# Patient Record
Sex: MALE | Race: WHITE | Employment: FULL TIME | URBAN - METROPOLITAN AREA
[De-identification: names, ages, dates, MRNs, and addresses within clinical notes are randomized per-mention and may not be internally consistent; named-entity substitution may affect disease eponyms.]

---

## 2017-06-19 ENCOUNTER — HOSPITAL ENCOUNTER (OUTPATIENT)
Dept: ULTRASOUND IMAGING | Age: 42
Discharge: HOME | End: 2017-06-19
Payer: COMMERCIAL

## 2017-06-19 DIAGNOSIS — E04.1 THYROID NODULE: ICD-10-CM

## 2017-06-19 PROCEDURE — 76536 US EXAM OF HEAD AND NECK: CPT

## 2017-07-31 ENCOUNTER — OFFICE VISIT (OUTPATIENT)
Dept: ENDOCRINOLOGY | Age: 42
End: 2017-07-31

## 2017-07-31 VITALS
HEART RATE: 80 BPM | HEIGHT: 69 IN | BODY MASS INDEX: 25.92 KG/M2 | WEIGHT: 175 LBS | SYSTOLIC BLOOD PRESSURE: 126 MMHG | DIASTOLIC BLOOD PRESSURE: 95 MMHG

## 2017-07-31 DIAGNOSIS — R93.89 ABNORMAL THYROID ULTRASOUND: ICD-10-CM

## 2017-07-31 DIAGNOSIS — D86.9 SARCOIDOSIS: ICD-10-CM

## 2017-07-31 DIAGNOSIS — E04.1 THYROID NODULE: Primary | ICD-10-CM

## 2017-07-31 RX ORDER — IBUPROFEN 200 MG
TABLET ORAL
COMMUNITY

## 2017-07-31 RX ORDER — BUDESONIDE AND FORMOTEROL FUMARATE DIHYDRATE 80; 4.5 UG/1; UG/1
1 AEROSOL RESPIRATORY (INHALATION) DAILY
COMMUNITY
Start: 2017-05-04

## 2017-07-31 RX ORDER — AMLODIPINE BESYLATE 5 MG/1
5 TABLET ORAL DAILY
COMMUNITY

## 2017-07-31 NOTE — MR AVS SNAPSHOT
Visit Information Date & Time Provider Department Dept. Phone Encounter #  
 7/31/2017  3:30 PM Jackie De Oliveira, [address] [facility]ology [phone] Follow-up Instructions Return in about 3 months (around 10/31/2017). Upcoming Health Maintenance Date Due DTaP/Tdap/Td series (1 - Tdap) 9/24/1997 INFLUENZA AGE 9 TO ADULT 8/1/2017 Allergies as of 7/31/2017  Review Complete On: 7/31/2017 By: Jackie De Oliveira MD  
  
 Severity Noted Reaction Type Reactions Other Plant, Animal, Environmental  07/31/2017    Other (comments) Dust, mold, cockroach Current Immunizations  Reviewed on 9/26/2014 No immunizations on file. Not reviewed this visit You Were Diagnosed With   
  
 Codes Comments Thyroid nodule    -  Primary ICD-10-CM: E04.1 ICD-9-CM: 241.0 [facility]     ICD-10-CM: D86.9 ICD-9-CM: 135 Abnormal thyroid ultrasound     ICD-10-CM: R93.8 ICD-9-CM: 794.5 Vitals BP Pulse Height(growth percentile) Weight(growth percentile) BMI Smoking Status (!) 126/95 80 5' 9\" (1.753 m) 175 lb (79.4 kg) 25.84 kg/m2 Former Smoker Vitals History BMI and BSA Data Body Mass Index Body Surface Area  
 25.84 kg/m 2 1.97 m 2 Preferred Pharmacy Pharmacy Name Phone [facility]/PH[facility]ACY #[id number]- [place] [facility] [address] [facility] AT [address] [place] [phone] Your Updated Medication List  
  
   
This list is accurate as of: 7/31/17  4:34 PM.  Always use your most recent med list. ADVIL 200 mg tablet Generic drug:  ibuprofen Take  by mouth. As needed. amLODIPine 5 mg tablet Commonly known as:  Angy Pall Take 5 mg by mouth daily. SYMBICORT 80-4.5 mcg/actuation Hfaa inhaler Generic drug:  budesonide-formoterol Take 1 Puff by inhalation daily. We Performed the Following T4, FREE [id number] CPT(R)] THYROID PEROXIDASE (TPO) AB [03054 CPT(R)] TSH 3RD GENERATION [46011 CPT(R)] Follow-up Instructions Return in about 3 months (around 10/31/2017). To-Do List   
 08/02/2017 Imaging:  US GUIDE FINE NDL ASP W IMAGE Patient Instructions Thyroid nodule: Increased over last 4 years Reassess thyroid function Will order fine needle aspiration of nodule Labs to evaluate thyroid function Introducing [facility] SERVICES! [facility] introduces [employer] patient portal. Now you can access parts of your medical record, email your doctor's office, and request medication refills online. 1. In your internet browser, go to [email or web address] 2. Click on the First Time User? Click Here link in the Sign In box. You will see the New Member Sign Up page. 3. Enter your [employer] Access Code exactly as it appears below. You will not need to use this code after youve completed the sign-up process. If you do not sign up before the expiration date, you must request a new code. · [employer] Access Code: [email or web address] Expires: 9/14/2017 12:04 PM 
 
4. Enter the last four digits of your Social Security Number (xxxx) and Date of Birth (mm/dd/yyyy) as indicated and click Submit. You will be taken to the next sign-up page. 5. Create a [employer] ID. This will be your [employer] login ID and cannot be changed, so think of one that is secure and easy to remember. 6. Create a [employer] password. You can change your password at any time. 7. Enter your Password Reset Question and Answer. This can be used at a later time if you forget your password. 8. Enter your e-mail address. You will receive e-mail notification when new information is available in [address] 9. Click Sign Up. You can now view and download portions of your medical record. 10. Click the Download Summary menu link to download a portable copy of your medical information.  
 
If you have questions, please visit the Frequently Asked Questions section of the [employer]. Remember, [employer] is NOT to be used for urgent needs. For medical emergencies, dial 911. Now available from your iPhone and Android! Please provide this summary of care documentation to your next provider. Your primary care clinician is listed as Aisha Craven. If you have any questions after today's visit, please call [phone].

## 2017-07-31 NOTE — PROGRESS NOTES
History of Present Illness: Portia Garcia is a 37 y.o. male presents for follow-up of hypothyroidism  Nodules discovered incidentally on evaluation for sarcoidosis. Initial ultrasound done in 9/2014. Since last visit 1 year ago, he has moved to working at [facility] [employer] [place]. Overall, he has been doing well from a health standpoint, specifically, he has had less problems from the sarcoidosis. He has not needed any prednisone  Currently only takes a Symbicort inhaler as needed. He had an ultrasound done in advance of today's visit and this was discussed  Prior ultrasound nodule values  2014: 1.4 x 0.8 x 0.7  2015 1.4 x 0.8 x 1.3  2017 1.6 x 1.0 x 1.5      Family hx  Two brothers with type I diabetes. Also has family hx of thyroid disease. 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 Weight is up 6 pounds since last visit  He has baseline fatigue and feels particularly tired after lunch frequently. Social:      Past Medical History:   Diagnosis Date    Asthma     exercise induced asthma    Hypertension     Sarcoidosis ([address]     9/2014     Current Outpatient Prescriptions   Medication Sig    amLODIPine (NORVASC) 5 mg tablet Take 5 mg by mouth daily.  ibuprofen (ADVIL) 200 mg tablet Take  by mouth. As needed.  DULERA 100-5 mcg/actuation HFA inhaler      No current facility-administered medications for this visit. Allergies   Allergen Reactions    Other Plant, Animal, Environmental Other (comments)     Dust, mold, cockroach       Physical Examination:  Visit Vitals    BP (!) 126/95    Pulse 80    Ht 5' 9\" (1.753 m)    Wt 175 lb (79.4 kg)    BMI 25.84 kg/m2   -   - General: pleasant, no distress, normal gait   HEENT: hearing intact, EOMI, clear sclera without icterus  - Neck: +  thyromegaly with right lobe being more easily palpable than left lobe.   No LAD  - Cardiovascular: regular, normal rate - Respiratory: normal effort  - Integumentary: no edema  - Psychiatric: normal mood and affect    Data Reviewed:   Component      Latest Ref Rng & Units 8/11/2016 8/11/2016 8/11/2015 8/11/2015          11:13 AM 11:13 AM  9:36 AM  9:36 AM   TSH      0.450 - 4.500 uIU/mL 0.946   1.830   T4, Free      0.82 - 1.77 ng/dL  1.27 1.22    Thyroid peroxidase Ab      0 - 34 IU/mL         Component      Latest Ref Rng & Units 1/23/2015          10:52 AM   TSH      0.450 - 4.500 uIU/mL    T4, Free      0.82 - 1.77 ng/dL    Thyroid peroxidase Ab      0 - 34 IU/mL 8     6/2017 ultrasound  EXAM:  US THYROID/PARATHYROID/SOFT TISS     INDICATION:   Follow-up thyroid nodule. compare to 2015 study.     COMPARISON: 7/20/2015     FINDINGS: Real-time sonography of the thyroid gland was performed with a high  frequency linear transducer. Multiple static images were obtained.     The left lobe of the thyroid gland measures 5.0 x 1.7 x 1.5 cm. No nodules in  the left lobe.     The right lobe measures 5.6 x 2.2 x 2.1 cm. The nodular density posteriorly and  inferiorly in the right lobe of thyroid gland measures 1.6 x 1.0 x 1.5 cm. Previously this measured 1.4 x 0.8 x 1.3 cm the nodule is homogeneous in  appearance and iso to slightly hyperechoic. The margins are sharp. No cystic  degeneration or calcification is associated with this.     There are no new nodules. Isthmus of the thyroid gland measures 0.3 cm. The  thyroid gland remains heterogeneous bilaterally. IMPRESSION:  1. The nodular density inferiorly in the right lobe of thyroid gland has  slightly increased. Clinical correlation is suggested. Please see above text. Assessment/Plan:   1. Thyroid nodule   Thyroid ultrasound images personally reviewed by myself with Mr. Cl Solano present. The area of interest remains overall marginally defined.   Thyroid gland as a whole is more heterogeneous than prior values and overall has the appearance of a gland affected by Hashimoto's thyroiditis. Area of the nodule appears to be somewhat spared from this process and is more hyperechoic. Although I strongly feel the nodule is benign, due to the growth over the last 4 years, recommend he have it biopsied. Reviewed the FNA procedure with him [place]   2. Sarcoidosis ([facility]    3. Abnormal thyroid ultrasound   As noted above, thyroid ultrasound has features of Hashimoto's thyroiditis. Will check TSH, free T4 and thyroid peroxidase antibodies. He had questions about Hashimoto's thyroiditis and I did my best to explain this. Greater than 50% of 25 minute visit was spent counseling the patient about above, reviewing and discussing ultrasounds    Patient Instructions   Thyroid nodule: Increased over last 4 years    Reassess thyroid function    Will order fine needle aspiration of nodule    Labs to evaluate thyroid function        Follow-up Disposition:  Return in about 3 months (around 10/31/2017).     Copy sent to:

## 2017-07-31 NOTE — PATIENT INSTRUCTIONS
Thyroid nodule:   Increased over last 4 years    Reassess thyroid function    Will order fine needle aspiration of nodule    Labs to evaluate thyroid function

## 2017-08-01 LAB
T4 FREE SERPL-MCNC: 1.42 NG/DL (ref 0.82–1.77)
THYROPEROXIDASE AB SERPL-ACNC: 16 IU/ML (ref 0–34)
TSH SERPL DL<=0.005 MIU/L-ACNC: 0.88 UIU/ML (ref 0.45–4.5)

## 2017-08-04 ENCOUNTER — HOSPITAL ENCOUNTER (OUTPATIENT)
Dept: ULTRASOUND IMAGING | Age: 42
Discharge: HOME | End: 2017-08-04
Attending: INTERNAL MEDICINE
Payer: COMMERCIAL

## 2017-08-04 DIAGNOSIS — E04.1 THYROID NODULE: ICD-10-CM

## 2017-08-04 PROCEDURE — 88173 CYTOPATH EVAL FNA REPORT: CPT | Performed by: INTERNAL MEDICINE

## 2017-08-04 PROCEDURE — 10022 US GUIDE FINE NDL ASP W IMAGE: CPT

## 2017-08-04 PROCEDURE — 88305 TISSUE EXAM BY PATHOLOGIST: CPT | Performed by: INTERNAL MEDICINE

## 2017-08-07 NOTE — PROGRESS NOTES
Thyroid function tests is normal.  Possible that thyroid ultrasound abnormalities may be due to sarcoidosis. Await FNA of nodule. Honorio Stark,  Please call and mail lab letter. Thank you.

## 2017-08-14 ENCOUNTER — TELEPHONE (OUTPATIENT)
Dept: ENDOCRINOLOGY | Age: 42
End: 2017-08-14

## 2017-08-14 NOTE — TELEPHONE ENCOUNTER
MrKavon Shivam Carlos called and said that he had his thyroid Biopsy done and would like to discuss the results with nurse and wants to know what is the next step is. Sigrid zhao

## 2017-08-15 NOTE — TELEPHONE ENCOUNTER
Called  Estherpaul Dorsey to let him know biopsy results were normal/benign    Also discussed resent lab results.

## 2018-10-12 ENCOUNTER — HOSPITAL ENCOUNTER (OUTPATIENT)
Dept: CT IMAGING | Age: 43
Discharge: HOME | End: 2018-10-12
Attending: INTERNAL MEDICINE
Payer: COMMERCIAL

## 2018-10-12 DIAGNOSIS — C70.9 MALIGNANT NEOPLASM OF MENINGES (HCC): ICD-10-CM

## 2018-10-12 DIAGNOSIS — R10.9 ABDOMINAL PAIN: ICD-10-CM

## 2018-10-12 PROCEDURE — 74011636320 HC RX REV CODE- 636/320: Performed by: INTERNAL MEDICINE

## 2018-10-12 PROCEDURE — 74177 CT ABD & PELVIS W/CONTRAST: CPT

## 2018-10-12 RX ADMIN — IOPAMIDOL 100 ML: 755 INJECTION, SOLUTION INTRAVENOUS at 14:06

## 2019-02-10 ENCOUNTER — HOSPITAL ENCOUNTER (EMERGENCY)
Age: 44
Discharge: HOME | End: 2019-02-10
Attending: EMERGENCY MEDICINE | Admitting: EMERGENCY MEDICINE
Payer: COMMERCIAL

## 2019-02-10 ENCOUNTER — APPOINTMENT (OUTPATIENT)
Dept: CT IMAGING | Age: 44
End: 2019-02-10
Attending: EMERGENCY MEDICINE
Payer: COMMERCIAL

## 2019-02-10 VITALS
WEIGHT: 168.65 LBS | RESPIRATION RATE: 16 BRPM | BODY MASS INDEX: 24.98 KG/M2 | HEART RATE: 85 BPM | HEIGHT: 69 IN | DIASTOLIC BLOOD PRESSURE: 96 MMHG | OXYGEN SATURATION: 98 % | TEMPERATURE: 97.8 F | SYSTOLIC BLOOD PRESSURE: 141 MMHG

## 2019-02-10 DIAGNOSIS — R42 DIZZINESS: ICD-10-CM

## 2019-02-10 DIAGNOSIS — R11.0 NAUSEA WITHOUT VOMITING: ICD-10-CM

## 2019-02-10 DIAGNOSIS — G44.209 ACUTE NON INTRACTABLE TENSION-TYPE HEADACHE: Primary | ICD-10-CM

## 2019-02-10 LAB
ALBUMIN SERPL-MCNC: 3.8 G/DL (ref 3.5–5)
ALBUMIN/GLOB SERPL: 1 {RATIO} (ref 1.1–2.2)
ALP SERPL-CCNC: 52 U/L (ref 45–117)
ALT SERPL-CCNC: 22 U/L (ref 12–78)
ANION GAP SERPL CALC-SCNC: 5 MMOL/L (ref 5–15)
APPEARANCE UR: CLEAR
AST SERPL-CCNC: 20 U/L (ref 15–37)
BACTERIA URNS QL MICRO: NEGATIVE /HPF
BASOPHILS # BLD: 0 K/UL (ref 0–0.1)
BASOPHILS NFR BLD: 0 % (ref 0–1)
BILIRUB SERPL-MCNC: 0.5 MG/DL (ref 0.2–1)
BILIRUB UR QL: NEGATIVE
BUN SERPL-MCNC: 22 MG/DL (ref 6–20)
BUN/CREAT SERPL: 18 (ref 12–20)
CALCIUM SERPL-MCNC: 8.8 MG/DL (ref 8.5–10.1)
CHLORIDE SERPL-SCNC: 104 MMOL/L (ref 97–108)
CK SERPL-CCNC: 113 U/L (ref 39–308)
CO2 SERPL-SCNC: 30 MMOL/L (ref 21–32)
COLOR UR: NORMAL
CREAT SERPL-MCNC: 1.22 MG/DL (ref 0.7–1.3)
DIFFERENTIAL METHOD BLD: ABNORMAL
EOSINOPHIL # BLD: 0.5 K/UL (ref 0–0.4)
EOSINOPHIL NFR BLD: 13 % (ref 0–7)
EPITH CASTS URNS QL MICRO: NORMAL /LPF
ERYTHROCYTE [DISTWIDTH] IN BLOOD BY AUTOMATED COUNT: 12.2 % (ref 11.5–14.5)
GLOBULIN SER CALC-MCNC: 3.7 G/DL (ref 2–4)
GLUCOSE SERPL-MCNC: 90 MG/DL (ref 65–100)
GLUCOSE UR STRIP.AUTO-MCNC: NEGATIVE MG/DL
HCT VFR BLD AUTO: 45.6 % (ref 36.6–50.3)
HGB BLD-MCNC: 15.9 G/DL (ref 12.1–17)
HGB UR QL STRIP: NEGATIVE
HYALINE CASTS URNS QL MICRO: NORMAL /LPF (ref 0–5)
IMM GRANULOCYTES # BLD AUTO: 0 K/UL (ref 0–0.04)
IMM GRANULOCYTES NFR BLD AUTO: 0 % (ref 0–0.5)
KETONES UR QL STRIP.AUTO: NEGATIVE MG/DL
LEUKOCYTE ESTERASE UR QL STRIP.AUTO: NEGATIVE
LYMPHOCYTES # BLD: 0.9 K/UL (ref 0.8–3.5)
LYMPHOCYTES NFR BLD: 24 % (ref 12–49)
MAGNESIUM SERPL-MCNC: 2.1 MG/DL (ref 1.6–2.4)
MCH RBC QN AUTO: 30.2 PG (ref 26–34)
MCHC RBC AUTO-ENTMCNC: 34.9 G/DL (ref 30–36.5)
MCV RBC AUTO: 86.5 FL (ref 80–99)
MONOCYTES # BLD: 0.3 K/UL (ref 0–1)
MONOCYTES NFR BLD: 8 % (ref 5–13)
NEUTS SEG # BLD: 2.1 K/UL (ref 1.8–8)
NEUTS SEG NFR BLD: 54 % (ref 32–75)
NITRITE UR QL STRIP.AUTO: NEGATIVE
NRBC # BLD: 0 K/UL (ref 0–0.01)
NRBC BLD-RTO: 0 PER 100 WBC
PH UR STRIP: 6 [PH] (ref 5–8)
PLATELET # BLD AUTO: 175 K/UL (ref 150–400)
PMV BLD AUTO: 10.2 FL (ref 8.9–12.9)
POTASSIUM SERPL-SCNC: 3.4 MMOL/L (ref 3.5–5.1)
PROT SERPL-MCNC: 7.5 G/DL (ref 6.4–8.2)
PROT UR STRIP-MCNC: NEGATIVE MG/DL
RBC # BLD AUTO: 5.27 M/UL (ref 4.1–5.7)
RBC #/AREA URNS HPF: NORMAL /HPF (ref 0–5)
SODIUM SERPL-SCNC: 139 MMOL/L (ref 136–145)
SP GR UR REFRACTOMETRY: 1.01 (ref 1–1.03)
TROPONIN I SERPL-MCNC: <0.05 NG/ML
UA: UC IF INDICATED,UAUC: NORMAL
UROBILINOGEN UR QL STRIP.AUTO: 0.2 EU/DL (ref 0.2–1)
WBC # BLD AUTO: 3.9 K/UL (ref 4.1–11.1)
WBC URNS QL MICRO: NORMAL /HPF (ref 0–4)

## 2019-02-10 PROCEDURE — 36415 COLL VENOUS BLD VENIPUNCTURE: CPT

## 2019-02-10 PROCEDURE — 80053 COMPREHEN METABOLIC PANEL: CPT

## 2019-02-10 PROCEDURE — 93005 ELECTROCARDIOGRAM TRACING: CPT

## 2019-02-10 PROCEDURE — 81001 URINALYSIS AUTO W/SCOPE: CPT

## 2019-02-10 PROCEDURE — 70450 CT HEAD/BRAIN W/O DYE: CPT

## 2019-02-10 PROCEDURE — 83735 ASSAY OF MAGNESIUM: CPT

## 2019-02-10 PROCEDURE — 85025 COMPLETE CBC W/AUTO DIFF WBC: CPT

## 2019-02-10 PROCEDURE — 82550 ASSAY OF CK (CPK): CPT

## 2019-02-10 PROCEDURE — 99284 EMERGENCY DEPT VISIT MOD MDM: CPT

## 2019-02-10 PROCEDURE — 84484 ASSAY OF TROPONIN QUANT: CPT

## 2019-02-10 RX ORDER — MECLIZINE HYDROCHLORIDE 25 MG/1
25 TABLET ORAL
Qty: 10 TAB | Refills: 0 | Status: SHIPPED | OUTPATIENT
Start: 2019-02-10 | End: 2022-04-04

## 2019-02-10 RX ORDER — MECLIZINE HCL 12.5 MG 12.5 MG/1
25 TABLET ORAL
Status: DISCONTINUED | OUTPATIENT
Start: 2019-02-10 | End: 2019-02-11 | Stop reason: HOSPADM

## 2019-02-10 RX ORDER — ONDANSETRON 4 MG/1
4 TABLET, ORALLY DISINTEGRATING ORAL
Qty: 10 TAB | Refills: 0 | Status: SHIPPED | OUTPATIENT
Start: 2019-02-10 | End: 2022-04-04

## 2019-02-10 RX ORDER — ONDANSETRON 2 MG/ML
4 INJECTION INTRAMUSCULAR; INTRAVENOUS
Status: DISCONTINUED | OUTPATIENT
Start: 2019-02-10 | End: 2019-02-11 | Stop reason: HOSPADM

## 2019-02-11 LAB
ATRIAL RATE: 79 BPM
CALCULATED P AXIS, ECG09: 28 DEGREES
CALCULATED R AXIS, ECG10: -38 DEGREES
CALCULATED T AXIS, ECG11: 71 DEGREES
DIAGNOSIS, 93000: NORMAL
P-R INTERVAL, ECG05: 160 MS
Q-T INTERVAL, ECG07: 408 MS
QRS DURATION, ECG06: 90 MS
QTC CALCULATION (BEZET), ECG08: 467 MS
VENTRICULAR RATE, ECG03: 79 BPM

## 2019-02-11 NOTE — DISCHARGE INSTRUCTIONS
Patient Education        Dizziness: Care Instructions  Your Care Instructions  Dizziness is the feeling of unsteadiness or fuzziness in your head. It is different than having vertigo, which is a feeling that the room is spinning or that you are moving or falling. It is also different from lightheadedness, which is the feeling that you are about to faint. It can be hard to know what causes dizziness. Some people feel dizzy when they have migraine headaches. Sometimes bouts of flu can make you feel dizzy. Some medical conditions, such as heart problems or high blood pressure, can make you feel dizzy. Many medicines can cause dizziness, including medicines for high blood pressure, pain, or anxiety. If a medicine causes your symptoms, your doctor may recommend that you stop or change the medicine. If it is a problem with your heart, you may need medicine to help your heart work better. If there is no clear reason for your symptoms, your doctor may suggest watching and waiting for a while to see if the dizziness goes away on its own. Follow-up care is a key part of your treatment and safety. Be sure to make and go to all appointments, and call your doctor if you are having problems. It's also a good idea to know your test results and keep a list of the medicines you take. How can you care for yourself at home? · If your doctor recommends or prescribes medicine, take it exactly as directed. Call your doctor if you think you are having a problem with your medicine. · Do not drive while you feel dizzy. · Try to prevent falls. Steps you can take include:  ? Using nonskid mats, adding grab bars near the tub, and using night-lights. ? Clearing your home so that walkways are free of anything you might trip on.  ? Letting family and friends know that you have been feeling dizzy. This will help them know how to help you. When should you call for help? Call 911 anytime you think you may need emergency care.  For example, call if:    · You passed out (lost consciousness).     · You have dizziness along with symptoms of a heart attack. These may include:  ? Chest pain or pressure, or a strange feeling in the chest.  ? Sweating. ? Shortness of breath. ? Nausea or vomiting. ? Pain, pressure, or a strange feeling in the back, neck, jaw, or upper belly or in one or both shoulders or arms. ? Lightheadedness or sudden weakness. ? A fast or irregular heartbeat.     · You have symptoms of a stroke. These may include:  ? Sudden numbness, tingling, weakness, or loss of movement in your face, arm, or leg, especially on only one side of your body. ? Sudden vision changes. ? Sudden trouble speaking. ? Sudden confusion or trouble understanding simple statements. ? Sudden problems with walking or balance. ? A sudden, severe headache that is different from past headaches.    Call your doctor now or seek immediate medical care if:    · You feel dizzy and have a fever, headache, or ringing in your ears.     · You have new or increased nausea and vomiting.     · Your dizziness does not go away or comes back.    Watch closely for changes in your health, and be sure to contact your doctor if:    · You do not get better as expected. Where can you learn more? Go to http://sonja-josiah.info/. Enter T656 in the search box to learn more about \"Dizziness: Care Instructions. \"  Current as of: September 23, 2018  Content Version: 11.9  © 2448-7111 [employer]. Care instructions adapted under license by [employer] (which disclaims liability or warranty for this information). If you have questions about a medical condition or this instruction, always ask your healthcare professional. [place] [address] any warranty or liability for your use of this information. Patient Education        Headache: Care Instructions  Your Care Instructions    Headaches have many possible causes.  Most headaches aren't a sign of a more serious problem, and they will get better on their own. Home treatment may help you feel better faster. The doctor has checked you carefully, but problems can develop later. If you notice any problems or new symptoms, get medical treatment right away. Follow-up care is a key part of your treatment and safety. Be sure to make and go to all appointments, and call your doctor if you are having problems. It's also a good idea to know your test results and keep a list of the medicines you take. How can you care for yourself at home? · Do not drive if you have taken a prescription pain medicine. · Rest in a quiet, dark room until your headache is gone. Close your eyes and try to relax or go to sleep. Don't watch TV or read. · Put a cold, moist cloth or cold pack on the painful area for 10 to 20 minutes at a time. Put a thin cloth between the cold pack and your skin. · Use a warm, moist towel or a heating pad set on low to relax tight shoulder and neck muscles. · Have someone gently massage your neck and shoulders. · Take pain medicines exactly as directed. ? If the doctor gave you a prescription medicine for pain, take it as prescribed. ? If you are not taking a prescription pain medicine, ask your doctor if you can take an over-the-counter medicine. · Be careful not to take pain medicine more often than the instructions allow, because you may get worse or more frequent headaches when the medicine wears off. · Do not ignore new symptoms that occur with a headache, such as a fever, weakness or numbness, vision changes, or confusion. These may be signs of a more serious problem. To prevent headaches  · Keep a headache diary so you can figure out what triggers your headaches. Avoiding triggers may help you prevent headaches. Record when each headache began, how long it lasted, and what the pain was like (throbbing, aching, stabbing, or dull).  Write down any other symptoms you had with the headache, such as nausea, flashing lights or dark spots, or sensitivity to bright light or loud noise. Note if the headache occurred near your period. List anything that might have triggered the headache, such as certain foods (chocolate, cheese, wine) or odors, smoke, bright light, stress, or lack of sleep. · Find healthy ways to deal with stress. Headaches are most common during or right after stressful times. Take time to relax before and after you do something that has caused a headache in the past.  · Try to keep your muscles relaxed by keeping good posture. Check your jaw, face, neck, and shoulder muscles for tension, and try relaxing them. When sitting at a desk, change positions often, and stretch for 30 seconds each hour. · Get plenty of sleep and exercise. · Eat regularly and well. Long periods without food can trigger a headache. · Treat yourself to a massage. Some people find that regular massages are very helpful in relieving tension. · Limit caffeine by not drinking too much coffee, tea, or soda. But don't quit caffeine suddenly, because that can also give you headaches. · Reduce eyestrain from computers by blinking frequently and looking away from the computer screen every so often. Make sure you have proper eyewear and that your monitor is set up properly, about an arm's length away. · Seek help if you have depression or anxiety. Your headaches may be linked to these conditions. Treatment can both prevent headaches and help with symptoms of anxiety or depression. When should you call for help? Call 911 anytime you think you may need emergency care. For example, call if:    · You have signs of a stroke. These may include:  ? Sudden numbness, paralysis, or weakness in your face, arm, or leg, especially on only one side of your body. ? Sudden vision changes. ? Sudden trouble speaking. ? Sudden confusion or trouble understanding simple statements.   ? Sudden problems with walking or balance. ? A sudden, severe headache that is different from past headaches.    Call your doctor now or seek immediate medical care if:    · You have a new or worse headache.     · Your headache gets much worse. Where can you learn more? Go to http://sonja-josiah.info/. Enter M271 in the search box to learn more about \"Headache: Care Instructions. \"  Current as of: Claire 3, 2018  Content Version: 11.9  © 2006-2018 [employer]. Care instructions adapted under license by [employer] (which disclaims liability or warranty for this information). If you have questions about a medical condition or this instruction, always ask your healthcare professional. [place] [address] any warranty or liability for your use of this information. Patient Education        Nausea and Vomiting: Care Instructions  Your Care Instructions    When you are nauseated, you may feel weak and sweaty and notice a lot of saliva in your mouth. Nausea often leads to vomiting. Most of the time you do not need to worry about nausea and vomiting, but they can be signs of other illnesses. Two common causes of nausea and vomiting are stomach flu and food poisoning. Nausea and vomiting from viral stomach flu will usually start to improve within 24 hours. Nausea and vomiting from food poisoning may last from 12 to 48 hours. The doctor has checked you carefully, but problems can develop later. If you notice any problems or new symptoms, get medical treatment right away. Follow-up care is a key part of your treatment and safety. Be sure to make and go to all appointments, and call your doctor if you are having problems. It's also a good idea to know your test results and keep a list of the medicines you take. How can you care for yourself at home? · To prevent dehydration, drink plenty of fluids, enough so that your urine is light yellow or clear like water.  Choose water and other caffeine-free clear liquids until you feel better. If you have kidney, heart, or liver disease and have to limit fluids, talk with your doctor before you increase the amount of fluids you drink. · Rest in bed until you feel better. · When you are able to eat, try clear soups, mild foods, and liquids until all symptoms are gone for 12 to 48 hours. Other good choices include dry toast, crackers, cooked cereal, and gelatin dessert, such as Jell-O. When should you call for help? Call 911 anytime you think you may need emergency care. For example, call if:    · You passed out (lost consciousness).    Call your doctor now or seek immediate medical care if:    · You have symptoms of dehydration, such as:  ? Dry eyes and a dry mouth. ? Passing only a little dark urine. ? Feeling thirstier than usual.     · You have new or worsening belly pain.     · You have a new or higher fever.     · You vomit blood or what looks like coffee grounds.    Watch closely for changes in your health, and be sure to contact your doctor if:    · You have ongoing nausea and vomiting.     · Your vomiting is getting worse.     · Your vomiting lasts longer than 2 days.     · You are not getting better as expected. Where can you learn more? Go to http://sonja-josiah.info/. Enter 25 [id number] in the search box to learn more about \"Nausea and Vomiting: Care Instructions. \"  Current as of: September 23, 2018  Content Version: 11.9  © 0961-2457 [employer]. Care instructions adapted under license by [employer] (which disclaims liability or warranty for this information). If you have questions about a medical condition or this instruction, always ask your healthcare professional. [place] [address] any warranty or liability for your use of this information.

## 2019-02-11 NOTE — ED NOTES
Dr. Makenna Acevedo reviewed discharge instructions with the patient. The patient verbalized understanding. All questions and concerns were addressed. The patient declined a wheelchair and is discharged ambulatory in the care of family members with instructions and prescriptions in hand. Pt is alert and oriented x 4. Respirations are clear and unlabored.

## 2019-02-11 NOTE — ED NOTES
Assumed care of pt. Pt states he was feeling dizzy/faint since 1500 today. Pt states he has not done anything to alleviate symptoms, nothing makes it better or worse. Pt reports no nausea. Pt with wife (robert) at bedside.

## 2019-02-11 NOTE — ED PROVIDER NOTES
EMERGENCY DEPARTMENT HISTORY AND PHYSICAL EXAM      Date: 2/10/2019  Patient Name: Azeem Bush    History of Presenting Illness     Chief Complaint   Patient presents with    Headache     pt reports episode of pain to top of head at 1500 lasting for a few seconds with feeling of warmth all over, nausea, and dizziness lasting approx 1 hour    Dizziness       History Provided By: Patient    HPI: Azeem Bush, 44 y.o. male with PMHx significant for Sarcoidosis, HTN, and asthma, presents ambulatory to the ED with cc of an episode of 10/10 HA at 3 PM. He reports associated nausea, dizziness, and tingling in bilateral hands at that time. Pt describes the episode as feeling like he was \"hit on the top of the head with a baseball bat. \" He states the HA and associated sxs lasted for an hour before improving without intervention. Pt reports fatigue after sxs resolved. He reports HA is currently 3/10. Pt reports his BP was 131/90 after the episode resolved. He reports he has had 4 migraines over the past 2 years. Pt states his sxs did not feel like his migraines. He denies any trauma that prompted the episode. Pt confirms he has been eating and drinking normally. Pt denies associated photophobia, CP, or tinnitus. Pt denies taking blood thinners. Pt denies smoking. FMHx significant for HTN, DM, breast CA, and prostate CA. There are no other complaints, changes, or physical findings at this time. PCP: Ade Wells MD    No current facility-administered medications on file prior to encounter. Current Outpatient Medications on File Prior to Encounter   Medication Sig Dispense Refill    amLODIPine (NORVASC) 5 mg tablet Take 5 mg by mouth daily.  ibuprofen (ADVIL) 200 mg tablet Take  by mouth. As needed.  SYMBICORT 80-4.5 mcg/actuation HFAA inhaler Take 1 Puff by inhalation daily.          Past History     Past Medical History:  Past Medical History:   Diagnosis Date    Asthma     exercise induced asthma    Hypertension     Sarcoidosis     2014       Past Surgical History:  Past Surgical History:   Procedure Laterality Date    CHEST SURGERY PROCEDURE UNLISTED      NEEDLE BX    HX CYST REMOVAL  finger    2014    HX ORTHOPAEDIC  rt knee    2010  rt arthroscopic       Family History:  Family History   Problem Relation Age of Onset   [address] [facility] Cancer Mother         breast cancer    Hypertension Mother    [facility] Diabetes Father     Hypertension Father     Cancer Father         prostate cancer    Diabetes Brother         type I    Anesth Problems Neg Hx     Thyroid Disease Brother        Social History:  Social History     Tobacco Use    Smoking status: Former Smoker     Types: Cigarettes     Last attempt to quit: 2014     Years since quittin.4    Smokeless tobacco: Never Used   Substance Use Topics    Alcohol use: Yes     Comment: RARELY    Drug use: No       Allergies: Allergies   Allergen Reactions    Other Plant, Animal, Environmental Other (comments)     Dust, mold, cockroach         Review of Systems   Review of Systems   Constitutional: Positive for fatigue. Negative for appetite change. HENT: Negative for tinnitus. Eyes: Negative for photophobia. Respiratory: Negative for shortness of breath. Cardiovascular: Negative for chest pain. Gastrointestinal: Positive for nausea. Endocrine: Negative for heat intolerance. Genitourinary: Negative for dysuria. Musculoskeletal: Negative for back pain. Skin: Negative for rash. Allergic/Immunologic: Negative for immunocompromised state. Neurological: Positive for dizziness (spinning) and headaches. Positive for paresthesias in bilateral hands. Hematological: Does not bruise/bleed easily. Psychiatric/Behavioral: Negative. All other systems reviewed and are negative. Physical Exam   Physical Exam   Constitutional: He is oriented to person, place, and time. He appears well-developed and well-nourished.  No distress. HENT:   Head: Normocephalic and atraumatic. Eyes: EOM are normal. Pupils are equal, round, and reactive to light. Right eye exhibits no nystagmus. Left eye exhibits no nystagmus. Neck: Normal range of motion. Neck supple. Cardiovascular: Normal rate, regular rhythm and normal heart sounds. Pulmonary/Chest: Effort normal and breath sounds normal. He has no wheezes. Abdominal: Soft. Bowel sounds are normal. There is no tenderness. Musculoskeletal: Normal range of motion. He exhibits no edema or tenderness. Neurological: He is alert and oriented to person, place, and time. No cranial nerve deficit. Sensory intact. Motor intact. Skin: Skin is warm and dry. Psychiatric: He has a normal mood and affect. His behavior is normal.   Nursing note and vitals reviewed. Diagnostic Study Results     Labs -     Recent Results (from the past 12 hour(s))   EKG, 12 LEAD, INITIAL    Collection Time: 02/10/19  7:43 PM   Result Value Ref Range    Ventricular Rate 79 BPM    Atrial Rate 79 BPM    P-R Interval 160 ms    QRS Duration 90 ms    Q-T Interval 408 ms    QTC Calculation (Bezet) 467 ms    Calculated P Axis 28 degrees    Calculated R Axis -38 degrees    Calculated T Axis 71 degrees    Diagnosis       Normal sinus rhythm  Left axis deviation  No previous ECGs available     CBC WITH AUTOMATED DIFF    Collection Time: 02/10/19  7:48 PM   Result Value Ref Range    WBC 3.9 (L) 4.1 - 11.1 K/uL    RBC 5.27 4. 10 - 5.70 M/uL    HGB 15.9 12.1 - 17.0 g/dL    HCT 45.6 36.6 - 50.3 %    MCV 86.5 80.0 - 99.0 FL    MCH 30.2 26.0 - 34.0 PG    MCHC 34.9 30.0 - 36.5 g/dL    RDW 12.2 11.5 - 14.5 %    PLATELET 463 455 - 815 K/uL    MPV 10.2 8.9 - 12.9 FL    NRBC 0.0 0  WBC    ABSOLUTE NRBC 0.00 0.00 - 0.01 K/uL    NEUTROPHILS 54 32 - 75 %    LYMPHOCYTES 24 12 - 49 %    MONOCYTES 8 5 - 13 %    EOSINOPHILS 13 (H) 0 - 7 %    BASOPHILS 0 0 - 1 %    IMMATURE GRANULOCYTES 0 0.0 - 0.5 %    ABS.  NEUTROPHILS 2.1 1.8 - 8.0 K/UL    ABS. LYMPHOCYTES 0.9 0.8 - 3.5 K/UL    ABS. MONOCYTES 0.3 0.0 - 1.0 K/UL    ABS. EOSINOPHILS 0.5 (H) 0.0 - 0.4 K/UL    ABS. BASOPHILS 0.0 0.0 - 0.1 K/UL    ABS. IMM. GRANS. 0.0 0.00 - 0.04 K/UL    DF AUTOMATED     METABOLIC PANEL, COMPREHENSIVE    Collection Time: 02/10/19  7:48 PM   Result Value Ref Range    Sodium 139 136 - 145 mmol/L    Potassium 3.4 (L) 3.5 - 5.1 mmol/L    Chloride 104 97 - 108 mmol/L    CO2 30 21 - 32 mmol/L    Anion gap 5 5 - 15 mmol/L    Glucose 90 65 - 100 mg/dL    BUN 22 (H) 6 - 20 MG/DL    Creatinine 1.22 0.70 - 1.30 MG/DL    BUN/Creatinine ratio 18 12 - 20      GFR est AA >60 >60 ml/min/1.73m2    GFR est non-AA >60 >60 ml/min/1.73m2    Calcium 8.8 8.5 - 10.1 MG/DL    Bilirubin, total 0.5 0.2 - 1.0 MG/DL    ALT (SGPT) 22 12 - 78 U/L    AST (SGOT) 20 15 - 37 U/L    Alk.  phosphatase 52 45 - 117 U/L    Protein, total 7.5 6.4 - 8.2 g/dL    Albumin 3.8 3.5 - 5.0 g/dL    Globulin 3.7 2.0 - 4.0 g/dL    A-G Ratio 1.0 (L) 1.1 - 2.2     TROPONIN I    Collection Time: 02/10/19  7:48 PM   Result Value Ref Range    Troponin-I, Qt. <0.05 <0.05 ng/mL   CK W/ REFLX CKMB    Collection Time: 02/10/19  7:48 PM   Result Value Ref Range     39 - 308 U/L   MAGNESIUM    Collection Time: 02/10/19  7:48 PM   Result Value Ref Range    Magnesium 2.1 1.6 - 2.4 mg/dL   URINALYSIS W/ REFLEX CULTURE    Collection Time: 02/10/19  9:34 PM   Result Value Ref Range    Color YELLOW/STRAW      Appearance CLEAR CLEAR      Specific gravity 1.012 1.003 - 1.030      pH (UA) 6.0 5.0 - 8.0      Protein NEGATIVE  NEG mg/dL    Glucose NEGATIVE  NEG mg/dL    Ketone NEGATIVE  NEG mg/dL    Bilirubin NEGATIVE  NEG      Blood NEGATIVE  NEG      Urobilinogen 0.2 0.2 - 1.0 EU/dL    Nitrites NEGATIVE  NEG      Leukocyte Esterase NEGATIVE  NEG      WBC 0-4 0 - 4 /hpf    RBC 0-5 0 - 5 /hpf    Epithelial cells FEW FEW /lpf    Bacteria NEGATIVE  NEG /hpf    UA:UC IF INDICATED CULTURE NOT INDICATED BY UA RESULT CNI      Hyaline cast 0-2 0 - 5 /lpf       Radiologic Studies -   CT HEAD WO CONT   Final Result   IMPRESSION: No acute intracranial process              CT Results  (Last 48 hours)               02/10/19 2200  CT HEAD WO CONT Final result    Impression:  IMPRESSION: No acute intracranial process               Narrative:  EXAM: CT HEAD WO CONT   Clinical history: Headache and dizziness   INDICATION: pain/dizzy       COMPARISON: None. CONTRAST: None. TECHNIQUE: Unenhanced CT of the head was performed using 5 mm images. Brain and   bone windows were generated. CT dose reduction was achieved through use of a   standardized protocol tailored for this examination and automatic exposure   control for dose modulation. FINDINGS:   The ventricles and sulci are normal in size, shape and configuration and   midline. There is no significant white matter disease. There is no intracranial   hemorrhage, extra-axial collection, mass, mass effect or midline shift. The   basilar cisterns are open. No acute infarct is identified. The bone windows   demonstrate no abnormalities. The visualized portions of the paranasal sinuses   and mastoid air cells are clear. Medical Decision Making   I am the first provider for this patient. I reviewed the vital signs, available nursing notes, past medical history, past surgical history, family history and social history. Vital Signs-Reviewed the patient's vital signs. Patient Vitals for the past 12 hrs:   Temp Pulse Resp BP SpO2   02/10/19 1935 97.8 °F (36.6 °C) 85 16 (!) 141/96 98 %       EKG interpretation: (Preliminary) 19:43   Rhythm: normal sinus rhythm; and regular . Rate (approx.): 79; Axis: left axis deviation; TX interval: normal; QRS interval: normal ; ST/T wave: normal; Other: no prior EKG to compare.     Records Reviewed: Nursing Notes, Old Medical Records, Previous Radiology Studies and Previous Laboratory Studies    Provider Notes (Medical Decision Making): DDx: Acute HA, ICH, tension HA, vertigo, dehydration, electrolyte abnormality, and arrhythmia. ED Course:   Initial assessment performed. The patients presenting problems have been discussed, and they are in agreement with the care plan formulated and outlined with them. I have encouraged them to ask questions as they arise throughout their visit. Disposition:  Discharge Note:  10:13 PM  The pt is ready for discharge. The pt's signs, symptoms, diagnosis, and discharge instructions have been discussed and pt has conveyed their understanding. The pt is to follow up as recommended or return to ER should their symptoms worsen. Plan has been discussed and pt is in agreement. PLAN:  1. Current Discharge Medication List      START taking these medications    Details   ondansetron (ZOFRAN ODT) 4 mg disintegrating tablet Take 1 Tab by mouth every eight (8) hours as needed for Nausea. Qty: 10 Tab, Refills: 0      meclizine (ANTIVERT) 25 mg tablet Take 1 Tab by mouth three (3) times daily as needed for Dizziness. Qty: 10 Tab, Refills: 0           2. Follow-up Information     Follow up With Specialties Details Why Contact Info    Katheryn Sutton MD Neurology Call in 1 day  [address] [facility]  [phone]      Brandon Velasquez MD Family Practice In 2 days As needed [address] [address]  [phone]      [facility] EMERGENCY DEPT Emergency Medicine  If symptoms worsen [address] [facility]  [address] [facility]  [phone]        Return to ED if worse     Diagnosis     Clinical Impression:   1. Acute non intractable tension-type headache    2. Dizziness    3. Nausea without vomiting        Attestations: This note is prepared by Andria Fontana, acting as a Scribe for MD Shai Johnson MD: The scribe's documentation has been prepared under my direction and personally reviewed by me in its entirety.  I confirm that the notes above accurately reflects all work, treatment, procedures, and medical decision making performed by me.

## 2019-02-12 ENCOUNTER — OFFICE VISIT (OUTPATIENT)
Dept: NEUROLOGY | Age: 44
End: 2019-02-12

## 2019-02-12 VITALS
DIASTOLIC BLOOD PRESSURE: 82 MMHG | WEIGHT: 169 LBS | HEART RATE: 106 BPM | SYSTOLIC BLOOD PRESSURE: 130 MMHG | OXYGEN SATURATION: 98 % | HEIGHT: 69 IN | BODY MASS INDEX: 25.03 KG/M2

## 2019-02-12 DIAGNOSIS — G47.419 PRIMARY NARCOLEPSY WITHOUT CATAPLEXY: ICD-10-CM

## 2019-02-12 DIAGNOSIS — G47.50 EXPLODING HEAD SYNDROME: ICD-10-CM

## 2019-02-12 DIAGNOSIS — I67.1 CEREBRAL ANEURYSM: Primary | ICD-10-CM

## 2019-02-12 DIAGNOSIS — D86.89 NEUROSARCOIDOSIS: ICD-10-CM

## 2019-02-12 NOTE — PATIENT INSTRUCTIONS
MRI and CTA if normal then focus on the sleep doc. Ask him if you have a sleep disorder. · Narcolepsy  · Sleep apnea  · Idiopathic daytime hypersomnia. A Healthy Lifestyle: Care Instructions  Your Care Instructions    A healthy lifestyle can help you feel good, stay at a healthy weight, and have plenty of energy for both work and play. A healthy lifestyle is something you can share with your whole family. A healthy lifestyle also can lower your risk for serious health problems, such as high blood pressure, heart disease, and diabetes. You can follow a few steps listed below to improve your health and the health of your family. Follow-up care is a key part of your treatment and safety. Be sure to make and go to all appointments, and call your doctor if you are having problems. It's also a good idea to know your test results and keep a list of the medicines you take. How can you care for yourself at home? · Do not eat too much sugar, fat, or fast foods. You can still have dessert and treats now and then. The goal is moderation. · Start small to improve your eating habits. Pay attention to portion sizes, drink less juice and soda pop, and eat more fruits and vegetables. ? Eat a healthy amount of food. A 3-ounce serving of meat, for example, is about the size of a deck of cards. Fill the rest of your plate with vegetables and whole grains. ? Limit the amount of soda and sports drinks you have every day. Drink more water when you are thirsty. ? Eat at least 5 servings of fruits and vegetables every day. It may seem like a lot, but it is not hard to reach this goal. A serving or helping is 1 piece of fruit, 1 cup of vegetables, or 2 cups of leafy, raw vegetables. Have an apple or some carrot sticks as an afternoon snack instead of a candy bar. Try to have fruits and/or vegetables at every meal.  · Make exercise part of your daily routine.  You may want to start with simple activities, such as walking, bicycling, or slow swimming. Try to be active 30 to 60 minutes every day. You do not need to do all 30 to 60 minutes all at once. For example, you can exercise 3 times a day for 10 or 20 minutes. Moderate exercise is safe for most people, but it is always a good idea to talk to your doctor before starting an exercise program.  · Keep moving. Deadra Ozzie the lawn, work in the garden, or Mobil Oto Servis. Take the stairs instead of the elevator at work. · If you smoke, quit. People who smoke have an increased risk for heart attack, stroke, cancer, and other lung illnesses. Quitting is hard, but there are ways to boost your chance of quitting tobacco for good. ? Use nicotine gum, patches, or lozenges. ? Ask your doctor about stop-smoking programs and medicines. ? Keep trying. In addition to reducing your risk of diseases in the future, you will notice some benefits soon after you stop using tobacco. If you have shortness of breath or asthma symptoms, they will likely get better within a few weeks after you quit. · Limit how much alcohol you drink. Moderate amounts of alcohol (up to 2 drinks a day for men, 1 drink a day for women) are okay. But drinking too much can lead to liver problems, high blood pressure, and other health problems. Family health  If you have a family, there are many things you can do together to improve your health. · Eat meals together as a family as often as possible. · Eat healthy foods. This includes fruits, vegetables, lean meats and dairy, and whole grains. · Include your family in your fitness plan. Most people think of activities such as jogging or tennis as the way to fitness, but there are many ways you and your family can be more active. Anything that makes you breathe hard and gets your heart pumping is exercise. Here are some tips:  ? Walk to do errands or to take your child to school or the bus.  ? Go for a family bike ride after dinner instead of watching TV.   Where can you learn more?  Go to http://sonja-josiah.info/. Enter C364 in the search box to learn more about \"A Healthy Lifestyle: Care Instructions. \"  Current as of: September 11, 2018  Content Version: 11.9  © 2247-7261 [employer]. Care instructions adapted under license by [employer] (which disclaims liability or warranty for this information). If you have questions about a medical condition or this instruction, always ask your healthcare professional. [place] [address] any warranty or liability for your use of this information. Office Policies      Paperwork            Any paperwork that needs to be completed has a 3 WEEK turnaround time. Phone calls/patient messages:    · Please allow up to 24 hours for someone in the office to contact you about your call or message. Be mindful your provider may be out of the office or your message may require further review. We encourage you to use [employer] for your messages as this is a faster, more efficient way to communicate with our office           Medication Refills:    · Prescription medications require up to 48 business hours to process. We encourage you to use [employer] for your refills. · For controlled medications: Please allow up to 72 business hours to process. Certain medications may require you to  a written prescription at our office.   · NO narcotic/controlled medications will be prescribed after 4pm Monday through Friday or on weekends

## 2019-02-12 NOTE — PROGRESS NOTES
Name: Candelario Henry    Chief Complaint: headache dizziness    This is a 44year old male with a medical history of sarcoidosis and hypertension. He presented to the ER 2 days ago with a severe headache was resting watching TV felt a sudden explosion had with burning of the upper torso. Severe dizziness and tenderness of the head. Felt thirsty and drank. five cups of water. Dizziness persisted and after about forty five minutes he called the on call physician and was directed to go to the ER. He was seen at [facility]. He has a mostly uneventful night. All his tests were normal.   Has an unrelated complaint of excessive daytime sleepiness. He is frequently drowsy behind the wheel and will fall asleep in meetings. He has several maneuvers that he does to help him stay awake such as opening the windows in the car to allow the cold weather to stimulate him as well as talking to the radio or playing loud. He has not experience any cataplexy. Assesment and Plan  1. Exploding head syndrome  Discussed diagnosis in detail. Shared literature regarding diagnosis. He agreed that the symptoms seem to mimic what he experienced. We will get an MRI of the brain as well as a CTA as symptoms may belie a more serious issue such as intracerebral aneurysm. 2. Narcolepsy   Has increased daytime sleepiness in spite of normal sleep. 3. Sarcoid  In remission. 4.  Intracranial aneurysm  CT of the brain. Allergies  Other plant, animal, environmental     Medications  Current Outpatient Medications   Medication Sig    ondansetron (ZOFRAN ODT) 4 mg disintegrating tablet Take 1 Tab by mouth every eight (8) hours as needed for Nausea.  meclizine (ANTIVERT) 25 mg tablet Take 1 Tab by mouth three (3) times daily as needed for Dizziness.  amLODIPine (NORVASC) 5 mg tablet Take 5 mg by mouth daily.  ibuprofen (ADVIL) 200 mg tablet Take  by mouth. As needed.     SYMBICORT 80-4.5 mcg/actuation HFAA inhaler Take 1 Puff by inhalation daily. No current facility-administered medications for this visit. Medical History  Past Medical History:   Diagnosis Date    Asthma     exercise induced asthma    Hypertension     Sarcoidosis     9/2014     Review of Systems   Constitutional: Negative for chills and fever. HENT: Negative for ear pain. Eyes: Negative for pain and discharge. Respiratory: Negative for cough and hemoptysis. Cardiovascular: Negative for chest pain and claudication. Gastrointestinal: Negative for constipation and diarrhea. Genitourinary: Negative for flank pain and hematuria. Musculoskeletal: Negative for back pain and myalgias. Skin: Negative for itching and rash. Neurological: Positive for headaches. Hypersomnia   Endo/Heme/Allergies: Negative for environmental allergies. Does not bruise/bleed easily. Psychiatric/Behavioral: Negative for depression and hallucinations. Exam:  Visit Vitals  /82   Pulse (!) 106   Ht 5' 9\" (1.753 m)   Wt 169 lb (76.7 kg)   SpO2 98%   BMI 24.96 kg/m²         General: Well developed, well nourished. Patient in no apparent distress   Head: Normocephalic, atraumatic, anicteric sclera   Neck Normal ROM, No thyromegally   Lungs:  Clear to auscultation bilaterally, No wheezes or rubs   Cardiac: Regular rate and rhythm with no murmurs. Abd: Bowel sounds were audible. No tenderness on palpation   Ext: No pedal edema   Skin: Supple no rash     NeurologicExam:  Mental Status: Alert and oriented to person place and time   Speech: Fluent no aphasia or dysarthria. Cranial Nerves:   II Intact visual fields. III, IV VI Extra ocular movements intact  V Facial sensation is normal.   VII Facial movement is symmetric.     VIII Hearing intact no nystagmus    IX, X Normal gag, symmetric movement of palate and uvula  XI Symmetric shoulder shrug and head turn   XII Tongue midline with no atrophy  Undialated exam of the optic discs revealed sharp discs with no hemorrhage or exudate. Motor:  Full and symmetric strength of upper and lower proximal and distal muscles. Normal bulk and tone. Reflexes:   Deep tendon reflexes 2+/4 and symmetric. Sensory:   Symmetric and intact with no perceived deficits modalities involving small or large fibers. Gait:  Gait is balanced and fluid with normal arm swing. Tremor:   No tremor noted. Cerebellar:  Coordination intact. Neurovascular: No carotid bruits. No JVD         Imaging  CT Results (most recent):  Results from Hospital Encounter encounter on 02/10/19   CT HEAD WO CONT    Narrative EXAM: CT HEAD WO CONT  Clinical history: Headache and dizziness  INDICATION: pain/dizzy    COMPARISON: None. CONTRAST: None. TECHNIQUE: Unenhanced CT of the head was performed using 5 mm images. Brain and  bone windows were generated. CT dose reduction was achieved through use of a  standardized protocol tailored for this examination and automatic exposure  control for dose modulation. FINDINGS:  The ventricles and sulci are normal in size, shape and configuration and  midline. There is no significant white matter disease. There is no intracranial  hemorrhage, extra-axial collection, mass, mass effect or midline shift. The  basilar cisterns are open. No acute infarct is identified. The bone windows  demonstrate no abnormalities. The visualized portions of the paranasal sinuses  and mastoid air cells are clear.       Impression IMPRESSION: No acute intracranial process           Lab Review  Lab Results   Component Value Date/Time    WBC 3.9 (L) 02/10/2019 07:48 PM    HCT 45.6 02/10/2019 07:48 PM    HGB 15.9 02/10/2019 07:48 PM    PLATELET 315 08/67/2938 07:48 PM       Lab Results   Component Value Date/Time    Sodium 139 02/10/2019 07:48 PM    Potassium 3.4 (L) 02/10/2019 07:48 PM    Chloride 104 02/10/2019 07:48 PM    CO2 30 02/10/2019 07:48 PM    Glucose 90 02/10/2019 07:48 PM    BUN 22 (H) 02/10/2019 07:48 PM    Creatinine 1.22 02/10/2019 07:48 PM    Calcium 8.8 02/10/2019 07:48 PM       Lab Results   Component Value Date/Time    Hemoglobin A1c 5.3 08/11/2016 11:13 AM       Lab Results   Component Value Date/Time    Cholesterol, total 182 08/11/2016 11:13 AM    HDL Cholesterol 41 08/11/2016 11:13 AM    LDL, calculated 109 (H) 08/11/2016 11:13 AM    VLDL, calculated 32 08/11/2016 11:13 AM    Triglyceride 161 (H) 08/11/2016 11:13 AM

## 2019-02-22 ENCOUNTER — HOSPITAL ENCOUNTER (OUTPATIENT)
Dept: CT IMAGING | Age: 44
Discharge: HOME | End: 2019-02-22
Payer: COMMERCIAL

## 2019-02-22 ENCOUNTER — HOSPITAL ENCOUNTER (OUTPATIENT)
Dept: MRI IMAGING | Age: 44
Discharge: HOME | End: 2019-02-22
Payer: COMMERCIAL

## 2019-02-22 DIAGNOSIS — D86.89 NEUROSARCOIDOSIS: ICD-10-CM

## 2019-02-22 DIAGNOSIS — I67.1 CEREBRAL ANEURYSM: ICD-10-CM

## 2019-02-22 PROCEDURE — 70496 CT ANGIOGRAPHY HEAD: CPT

## 2019-02-22 PROCEDURE — 70551 MRI BRAIN STEM W/O DYE: CPT

## 2019-02-22 RX ORDER — SODIUM CHLORIDE 9 MG/ML
50 INJECTION, SOLUTION INTRAVENOUS
Status: COMPLETED | OUTPATIENT
Start: 2019-02-22 | End: 2019-02-22

## 2019-02-22 RX ORDER — SODIUM CHLORIDE 0.9 % (FLUSH) 0.9 %
10 SYRINGE (ML) INJECTION
Status: COMPLETED | OUTPATIENT
Start: 2019-02-22 | End: 2019-02-22

## 2019-02-22 RX ADMIN — Medication 10 ML: at 09:20

## 2019-02-22 RX ADMIN — SODIUM CHLORIDE 50 ML/HR: 9 INJECTION, SOLUTION INTRAVENOUS at 09:21

## 2019-03-01 ENCOUNTER — DOCUMENTATION ONLY (OUTPATIENT)
Dept: NEUROLOGY | Age: 44
End: 2019-03-01

## 2022-03-08 NOTE — PROGRESS NOTES
HPI: Fab Mcfarland (: 1975) is a 40 y.o. male, patient, here for evaluation of the following chief complaint(s):    Finger Pain (Left index finger DIP joint pain, weakness. Plays tennis and piano. No known injury. Left hand dominant.)  The patient is seen today to evaluate his left hand. Patient had a history of excision of a right small finger cyst several years ago near the PIP joint. He has developed what seems to represent a similar dorsal radial mucous cyst at the DIP joint of the left index finger. There has been no fall or injury. He is an avid tennis and . He localizes discomfort to the left index DIP joint worse when he inadvertently strikes something. He denies any digital numbness or tingling or any other digital involvement and is seen for further treatment. Vitals:  Ht 5' 9\" (1.753 m)   Wt 162 lb (73.5 kg)   BMI 23.92 kg/m²    Body mass index is 23.92 kg/m². Allergies   Allergen Reactions    Other Plant, Animal, Environmental Other (comments)     Dust, mold, cockroach       Current Outpatient Medications   Medication Sig    lisinopriL (PRINIVIL, ZESTRIL) 5 mg tablet Take 1 Tablet by mouth daily.  ergocalciferol, vitamin D2, (VITAMIN D2 PO) Take  by mouth.  amLODIPine (NORVASC) 5 mg tablet Take 5 mg by mouth daily.  ibuprofen (ADVIL) 200 mg tablet Take  by mouth. As needed.  SYMBICORT 80-4.5 mcg/actuation HFAA inhaler Take 1 Puff by inhalation daily.  LOSARTAN PO Take  by mouth. (Patient not taking: Reported on 3/9/2022)    ondansetron (ZOFRAN ODT) 4 mg disintegrating tablet Take 1 Tab by mouth every eight (8) hours as needed for Nausea. (Patient not taking: Reported on 3/9/2022)    meclizine (ANTIVERT) 25 mg tablet Take 1 Tab by mouth three (3) times daily as needed for Dizziness. (Patient not taking: Reported on 3/9/2022)     No current facility-administered medications for this visit.        Past Medical History:   Diagnosis Date    Anxiety disorder     Asthma     exercise induced asthma    Constipation     Hypertension     Sarcoidosis     2014        Past Surgical History:   Procedure Laterality Date    HX CYST REMOVAL  finger        HX ORTHOPAEDIC  rt knee    2010  rt arthroscopic    [place] CHEST SURGERY PROCEDURE UNLISTED      [facility]       Family History   Problem Relation Age of Onset   [facility] Cancer Mother         breast cancer    Hypertension Mother     Diabetes Father     Hypertension Father     Cancer Father         prostate cancer    Diabetes Brother         type I    Thyroid Disease Brother     Anesth Problems Neg Hx         Social History     Tobacco Use    Smoking status: Former Smoker     Types: Cigarettes     Quit date: 2014     Years since quittin.5    Smokeless tobacco: Never Used   Substance Use Topics    Alcohol use: Yes     Comment: RARELY    Drug use: No        Review of Systems    ROS     Positive for: Musculoskeletal    Last edited by Denyse Severance on 3/9/2022  9:21 AM. (History)             Physical Exam    Both hands demonstrate good range of motion with no digital clicking or locking. No cyst or mass on the right hand but on the left hand is about a 3 mm cyst dorsal radial over the DIP joint. No nail changes. Pain is a little bit worse in deep flexion. No other cystic involvement. Imaging:    XR Results (most recent):  Results from Appointment encounter on 22    XR HAND BILAT AP LAT OBL (MIN 3 V)    Narrative  AP, lateral and oblique x-ray of both hands were obtained. Overall there is good osseous bone density perhaps just the slightest hint of osteopenia in the phalanxes. Minimal if any significant narrowing of the DIP spaces. No advanced spur formation. No acute fractures. ASSESSMENT/PLAN:  Below is the assessment and plan developed based on review of pertinent history, physical exam, labs, studies, and medications.     Patient examination was consistent with a dorsal radial mucous cyst of the left index finger DIP joint. This more commonly is associated with advanced arthritis. There is really minimal if any true narrowing of the DIP joint spaces in general and there is no dorsal spur formation on the lateral images. Currently recommended observation as he is only notices since late February. If this does enlarge worsen or become more painful I explained that he would likely be best treated with excision. I will see him back in 3 to 4 weeks to check his progress. 1. Left hand pain  -     XR HAND BILAT AP LAT OBL (MIN 3 V); Future  2. Digital mucinous cyst of finger of left hand      Return in about 4 weeks (around 4/6/2022). An electronic signature was used to authenticate this note.   -- Crystal Gallo MD

## 2022-03-09 ENCOUNTER — OFFICE VISIT (OUTPATIENT)
Dept: ORTHOPEDIC SURGERY | Age: 47
End: 2022-03-09
Payer: COMMERCIAL

## 2022-03-09 VITALS — WEIGHT: 162 LBS | HEIGHT: 69 IN | BODY MASS INDEX: 23.99 KG/M2

## 2022-03-09 DIAGNOSIS — M67.442 DIGITAL MUCINOUS CYST OF FINGER OF LEFT HAND: ICD-10-CM

## 2022-03-09 DIAGNOSIS — M79.642 LEFT HAND PAIN: Primary | ICD-10-CM

## 2022-03-09 PROCEDURE — 99203 OFFICE O/P NEW LOW 30 MIN: CPT | Performed by: ORTHOPAEDIC SURGERY

## 2022-03-09 RX ORDER — LISINOPRIL 5 MG/1
1 TABLET ORAL DAILY
COMMUNITY
Start: 2021-07-28

## 2022-03-09 NOTE — LETTER
3/9/2022    Patient: Vee Gibbons   YOB: 1975   Date of Visit: 3/9/2022     Cindi Brandt MD  [address] [place] [id number]  Via Fax: [phone]    Dear Cindi Brandt MD,      Thank you for referring Mr. Carlos Alberto Gonsales to [facility] for evaluation. My notes for this consultation are attached. If you have questions, please do not hesitate to call me. I look forward to following your patient along with you.       Sincerely,    Diana Lugo MD

## 2022-04-04 ENCOUNTER — OFFICE VISIT (OUTPATIENT)
Dept: ORTHOPEDIC SURGERY | Age: 47
End: 2022-04-04
Payer: COMMERCIAL

## 2022-04-04 VITALS — BODY MASS INDEX: 23.99 KG/M2 | HEIGHT: 69 IN | WEIGHT: 162 LBS

## 2022-04-04 DIAGNOSIS — M25.569 KNEE PAIN, UNSPECIFIED CHRONICITY, UNSPECIFIED LATERALITY: Primary | ICD-10-CM

## 2022-04-04 DIAGNOSIS — M23.8X2 CHONDRAL DEFECT OF LEFT PATELLA: ICD-10-CM

## 2022-04-04 PROCEDURE — 99214 OFFICE O/P EST MOD 30 MIN: CPT | Performed by: ORTHOPAEDIC SURGERY

## 2022-04-04 NOTE — PATIENT INSTRUCTIONS
MRI SCHEDULING INSTRUCTIONS    In order to schedule your MRI with [facility], please call [phone]. If it is more convenient, you may stop by the MRI department on the way out of the office. The MRI department is located on the 1st floor, [address]. They will be happy to assist you. Once you have your MRI scheduled, please contact our  at [phone] to make your follow up appointment with Dr. Gordon Hood to come in for your test results! The appointment should be made for at least 3 business days after the MRI appointment. Thank you,  Dr. Anamika Cohn III, MD         Magnetic Resonance Imaging (MRI): About This Test  What is it? Magnetic resonance imaging (MRI) is a test that uses a magnetic field and pulses of radio wave energy to make pictures of organs and structures inside the body. When you have an MRI, you lie on a table and your body is moved into the MRI machine, where an image is taken of the area of the body being studied. Why is this test done? There are many reasons to have an MRI test. This test can find problems such as tumors, bleeding, injury, conditions that some children are born with, and infection. An MRI also may provide more information about a problem seen on an X-ray, ultrasound scan, CT scan, or nuclear medicine exam.  How can you prepare for the test?  For some MRI pictures of the belly, you may be asked to not eat or drink for several hours before the test.  Tell your doctor if you get nervous in tight spaces. You may get a medicine to help you relax. If you think you'll get this medicine, be sure to arrange a ride home. It may be unsafe for you to drive or get home on your own. How is the test done? · You may have contrast materials (dye) put into your arm through a tube called an IV. · You will lie on a table that is part of the MRI scanner. · The table will slide into the space that contains the magnet.   · Inside the scanner you will hear a fan and feel air moving. You may hear tapping, thumping, or snapping noises. You may be given earplugs or headphones to reduce the noise. · You will be asked to hold still during the scan. You may be asked to hold your breath for short periods. · You may be alone in the scanning room. But a technologist will watch you through a window and talk with you during the test.  What are the risks of the test?  · Contrast material that contains gadolinium may be used in this test. But for most people, the benefit of its use in this test outweighs the risk. Be sure to tell your doctor if you have kidney problems or are pregnant. · If you breastfeed and are concerned about whether the dye used in this test is safe, talk to your doctor. Most experts believe that very little dye passes into breast milk and even less is passed on to the baby. But if you are concerned, you can stop breastfeeding for up to 24 hours after the test. During this time, you can give your baby breast milk that you stored before the test. Don't use the breast milk you pump in the 24 hours after the test. Throw it out. How long does the test take? The test usually takes 30 to 60 minutes. But it can take as long as 2 hours. What happens after the test?  You will probably be able to go home right away, depending on the reason for the test.  Follow-up care is a key part of your treatment and safety. Be sure to make and go to all appointments, and call your doctor if you are having problems. It's also a good idea to keep a list of the medicines you take. Ask your doctor when you can expect to have your test results. Where can you learn more? Go to http://www.gray.com/  Enter V016 in the search box to learn more about \"Magnetic Resonance Imaging (MRI): About This Test.\"  Current as of: June 17, 2021               Content Version: 13.0  © 4306-1740 [employer].    Care instructions adapted under license by [address] (which disclaims liability or warranty for this information). If you have questions about a medical condition or this instruction, always ask your healthcare professional. [address] any warranty or liability for your use of this information.

## 2022-04-12 PROBLEM — M23.8X2 CHONDRAL DEFECT OF LEFT PATELLA: Status: ACTIVE | Noted: 2022-04-12

## 2022-04-13 ENCOUNTER — OFFICE VISIT (OUTPATIENT)
Dept: ORTHOPEDIC SURGERY | Age: 47
End: 2022-04-13
Payer: COMMERCIAL

## 2022-04-13 VITALS — WEIGHT: 162 LBS | BODY MASS INDEX: 23.99 KG/M2 | HEIGHT: 69 IN

## 2022-04-13 DIAGNOSIS — M23.8X2 CHONDRAL DEFECT OF LEFT PATELLA: Primary | ICD-10-CM

## 2022-04-13 PROCEDURE — 99214 OFFICE O/P EST MOD 30 MIN: CPT | Performed by: ORTHOPAEDIC SURGERY

## 2022-04-13 NOTE — PROGRESS NOTES
ASSESSMENT/PLAN:  Below is the assessment and plan developed based on review of pertinent history, physical exam, labs, studies, and medications. 1. Knee pain, unspecified chronicity, unspecified laterality      No follow-ups on file. In discussion with the patient, we considered the numerus possible diagnoses that could be contributing to their present symptoms. We also deliberated on the extensive management options that must be considered to treat their current condition. We reviewed their accessible prior medical records, diagnostic tests, and current health and employment information. We considered how these symptoms were affecting the patient´s activities of daily living as well as employment and fitness activities. The patient had various questions regarding the possible risks, benefits, complications, morbidity and mortality regarding their diagnosis and treatment options. The patients´ comorbidities were considered, and I advocated that they consider maximizing lifestyle modification through nutrition and exercise to aid in addressing their symptoms. Shared decision making yielded an understanding to move forward with conservation treatment preferences. The patient expressed understanding that if conservative management fails to alleviate the present symptoms they will return to office for re-evaluation and consideration of additional diagnostic tests and potential surgical options. In the interim, we have recommended ice, elevation, and anti-inflammatory medications along with a physician directed home exercise program. We discussed the risks and common side effects of anti-inflammatory medications and instructed the patient to discontinue the medication and contact us if they experienced any side effects. The patient was encouraged to discuss the possible side effects with their family physician or pharmacist prior to initiating any new medications.     Given that the patient's symptoms are increasing in frequency and duration, we have decided to evaluate the etiology of the pain and loss of function with an MRI. We discussed the risks of an MRI which include, but are not limited to the enclosed space, noisy environment, magnetic effect on implanted metal. We also talked about the fact that MRI is also contraindicated in the presence of internal metallic objects such as bullets or shrapnel, as well as surgical clips, pins, plates, screws, metal sutures, or wire mesh. We talked about the fact that MRI does not use radiation, but it may be contrindicated if the patient has implanted pacemakers, intracranial aneurysm clips, cochlear implants, certain prosthetic devices, implanted drug infusion pumps, neurostimulators, bone-growth stimulators, certain intrauterine contraceptive devices; or any other type of iron-based metal implants. We discussed the fact that if you are pregnant or suspect that you may be pregnant, you should notify your physician and consult with your primary care or obstetrician before having an MRI. Although rare, we talked about the fact that if contrast dye is used, there is a risk for allergic reaction to the dye. Patients who are allergic to or sensitive to medications, contrast dye, iodine, or shellfish should notify the radiologist or technologist prior to the administration of dye. MRI contrast may also influence other conditions such as allergies, asthma, anemia, hypotension (low blood pressure), and sickle cell disease. The patient has expressed understanding of these risks and I will see the patient back after the MRI to discuss the findings as well as the treatment options. Given that the patient's symptoms are increasing in frequency and duration we have decided to prescribe physical therapy.   We talked about the fact that the goal of physical therapy is for the therapist to assist in developing a program to help return the patient to full strength, function and mobility and decrease pain. We also discussed that the therapist may combine several techniques to help decrease pain. These include but are not limited to stretching, balance exercises, strength training, massage, cold and heat therapy, and electrical stimulation. Although, physical therapy is generally safe, we went over the potential risks to include the worsening of pre-existing conditions, continued pain and no improvement in flexibility, mobility, and strength. We will have the patient follow up after physical therapy to closely monitor their progress. We talked about following up sooner if therapy is not progressing on a weekly basis. We talked about the fact that he had a patellofemoral chondral defect. We will focus primarily on warming up as well as stretching. We will also focus on preemptive anti-inflammatories prior to matches. SUBJECTIVE/OBJECTIVE:  Meka Francois (: 1975) is a 40 y.o. male, patient,here for evaluation of the No chief complaint on file. Marquis Yepezs He seen today for his left knee as well as his right knee. He reports he is very active with volleyball and tennis. Reports he did have surgery with a partial medial meniscectomy on the left knee in . He reports his knees feel full. He denies any traumatic incident. He reports that he still quite active with tennis. He denies any popping or clicking. He reports rest does make it better but activity makes it worse. Reports he has difficulty going up and down stairs. Physical Exam    Upon physical examination, the patient is well developed, well nourished, alert and oriented times three, with normal mood and affect and walks with an antalgic gait. Upon examination of the left knee, the patient is nontender to palpation along the medial and lateral joint lines, and has no effusion. They are tender to palpation along the medial and lateral facets of the patella.  They have crepitus of the patellofemoral joint with range of motion and discomfort with patella grind testing. The patient has no discomfort with Nadine´s maneuvers, and the knee is stable. They have full range of motion. They have 5/5 strength, and are neurovascularly intact distally. There is no erythema, warmth or skin lesions present. On examination of the contralateral extremity, the patient is nontender to palpation and has excellent range of motion, stability and strength. Imaging:    EXAM: MRI KNEE LT WO CONT     INDICATION: Left knee pain. Chondral defect of left patella. Prior arthroscopy     COMPARISON: None     TECHNIQUE: Axial T2 fat-saturated and proton density fat-saturated; coronal T1  and proton density fat-saturated; and sagittal T2 fat-saturated, proton density  fat-saturated, and gradient echo MRI of the left knee .     CONTRAST: None.      FINDINGS: Bone marrow: Within normal limits. No acute fracture, dislocation, or  marrow replacing process.      Joint fluid: None.      Collateral ligaments and posterior, lateral corner: Intact.     Medial meniscus: Degenerative signal is seen in the junction of the body and  posterior horn. There is a tiny focus of intermediate signal on a single slice  towards the free edge on series 8 image 8 but statistically will not represent a  tear.       Lateral meniscus: Intact.     ACL and PCL: Intact.     Tendons: Intact.     Muscles: Within normal limits.     Patellofemoral alignment: There is mild patellar subluxation and tilting. Trochlear groove is not hypoplastic. TT-TG distance: 11 mm     Articular cartilage: There is a 5 x 3 mm area of increased signal in the  cartilage of the lateral patellar facet. There is an associated full-thickness  fissure with underlying mild edema.     Soft tissue mass: None.     IMPRESSION     1. Full-thickness fissure in the lateral patellar facet with mild surrounding  chondromalacia and underlying mild subchondral edema.   2. Degenerative signal in the junction of the body and posterior horn of the  medial meniscus. A tiny focus of intermediate signal in the undersurface near  the free edge is seen on only a single slice and is statistically unlikely to  represent a tear. This may represent posttreatment change. Allergies   Allergen Reactions    Other Plant, Animal, Environmental Other (comments)     Dust, mold, cockroach       Current Outpatient Medications   Medication Sig    lisinopriL (PRINIVIL, ZESTRIL) 5 mg tablet Take 1 Tablet by mouth daily.  ergocalciferol, vitamin D2, (VITAMIN D2 PO) Take  by mouth.  LOSARTAN PO Take  by mouth. (Patient not taking: Reported on 3/9/2022)    ondansetron (ZOFRAN ODT) 4 mg disintegrating tablet Take 1 Tab by mouth every eight (8) hours as needed for Nausea. (Patient not taking: Reported on 3/9/2022)    meclizine (ANTIVERT) 25 mg tablet Take 1 Tab by mouth three (3) times daily as needed for Dizziness. (Patient not taking: Reported on 3/9/2022)    amLODIPine (NORVASC) 5 mg tablet Take 5 mg by mouth daily.  ibuprofen (ADVIL) 200 mg tablet Take  by mouth. As needed.  SYMBICORT 80-4.5 mcg/actuation HFAA inhaler Take 1 Puff by inhalation daily. No current facility-administered medications for this visit.        Past Medical History:   Diagnosis Date    Anxiety disorder     Asthma     exercise induced asthma    Constipation     Hypertension     Sarcoidosis     9/2014       Past Surgical History:   Procedure Laterality Date    HX CYST REMOVAL  finger    2014    HX ORTHOPAEDIC  rt knee    2010  rt arthroscopic    [place] CHEST SURGERY PROCEDURE UNLISTED      [facility]       Family History   Problem Relation Age of Onset   [facility] Cancer Mother         breast cancer    Hypertension Mother    [facility] Diabetes Father     Hypertension Father     Cancer Father         prostate cancer    Diabetes Brother         type I    Thyroid Disease Brother     Anesth Problems Neg Hx        Social History     Socioeconomic History    Marital status:      Spouse name: Not on file    Number of children: Not on file    Years of education: Not on file    Highest education level: Not on file   Occupational History    Not on file   Tobacco Use    Smoking status: Former Smoker     Types: Cigarettes     Quit date: 2014     Years since quittin.6    Smokeless tobacco: Never Used   Substance and Sexual Activity    Alcohol use: Yes     Comment: RARELY    Drug use: No    Sexual activity: Yes   Other Topics Concern    Not on file   Social History Narrative    . Works as a teacher. Social Determinants of Health     Financial Resource Strain:     Difficulty of Paying Living Expenses: Not on file   Food Insecurity:     Worried About Running Out of Food in the Last Year: Not on file    Robin of Food in the Last Year: Not on file   Transportation Needs:     Lack of Transportation (Medical): Not on file    Lack of Transportation (Non-Medical): Not on file   Physical Activity:     Days of Exercise per Week: Not on file    Minutes of Exercise per Session: Not on file   Stress:     Feeling of Stress : Not on file   Social Connections:     Frequency of Communication with Friends and Family: Not on file    Frequency of Social Gatherings with Friends and Family: Not on file    Attends Jain Services: Not on file    Active Member of [address] [place] [address] or Organizations: Not on file    Attends Club or Organization Meetings: Not on file    Marital Status: Not on file   Intimate Partner Violence:     Fear of Current or Ex-Partner: Not on file    Emotionally Abused: Not on file    Physically Abused: Not on file    Sexually Abused: Not on file   Housing Stability:     Unable to Pay for Housing in the Last Year: Not on file    Number of [place] in the Last Year: Not on file    Unstable Housing in the Last Year: Not on file       Review of Systems    No flowsheet data found. Vitals: There were no vitals taken for this visit. There is no height or weight on file to calculate BMI. An electronic signature was used to authenticate this note.   -- Lilly Prasad MD

## 2022-06-14 ENCOUNTER — OFFICE VISIT (OUTPATIENT)
Dept: ORTHOPEDIC SURGERY | Age: 47
End: 2022-06-14
Payer: COMMERCIAL

## 2022-06-14 VITALS — WEIGHT: 165 LBS | HEIGHT: 69 IN | BODY MASS INDEX: 24.44 KG/M2

## 2022-06-14 DIAGNOSIS — S93.421A SPRAIN OF DELTOID LIGAMENT OF RIGHT ANKLE, INITIAL ENCOUNTER: Primary | ICD-10-CM

## 2022-06-14 DIAGNOSIS — M25.571 RIGHT ANKLE PAIN, UNSPECIFIED CHRONICITY: ICD-10-CM

## 2022-06-14 PROCEDURE — 99212 OFFICE O/P EST SF 10 MIN: CPT | Performed by: ORTHOPAEDIC SURGERY

## 2022-06-14 NOTE — LETTER
6/14/2022    Patient: Perry Suarez   YOB: 1975   Date of Visit: 6/14/2022     Steve Varghese MD  [address] [place], [place] [address]r [facility] [address]  Via Fax: [phone]    Dear Steve Varghese MD,      Thank you for referring Mr. Rah Simeon to [facility] for evaluation. My notes for this consultation are attached. If you have questions, please do not hesitate to call me. I look forward to following your patient along with you.       Sincerely,    Aidan Rodrigues MD

## 2022-06-14 NOTE — PROGRESS NOTES
True Nickerson (: 1975) is a 40 y.o. male, patient,here for evaluation of the following   Chief Complaint   Patient presents with    Ankle Pain        ASSESSMENT/PLAN:  Below is the assessment and plan developed based on review of pertinent history, physical exam, labs, studies, and medications. 1. Sprain of deltoid ligament of right ankle, initial encounter  -     REFERRAL TO PHYSICAL THERAPY  2. Right ankle pain, unspecified chronicity  -     XR STANDING ANKLE RT MIN 3 V; Future  -     REFERRAL TO PHYSICAL THERAPY    Patient appears to have had a deltoid ligament sprain as he is most tender around that area, however no gross instability or abnormality to the joint. He does have what appears to be an old avulsion fracture where he is minimally tender. I recommend regaining some of the strength, balance and proprioception prior to returning to tennis. I did recommend using the brace for the next 2 to 4 weeks and then he can start weaning out of the brace. I have also referred him to physical therapy to start since he wants to get back to high-impact activities so in hopes that he would get back to those activities sooner rather than later. I do recommend repeat follow-up just to recheck make sure that the avulsion fracture is definitely old and not a new finding, there should be some difference in the x-rays at that site if indeed this was a new injury. However the treatment would have been the same, its a nondisplaced ankle mortise with normal joint. Next follow-up we will get new x-rays of right ankle 3 views nonweightbearing. Return in about 4 weeks (around 2022) for repeat xrays. Allergies   Allergen Reactions    Other Plant, Animal, Environmental Other (comments)     Dust, mold, cockroach       Current Outpatient Medications   Medication Sig    lisinopriL (PRINIVIL, ZESTRIL) 5 mg tablet Take 1 Tablet by mouth daily.  LOSARTAN PO Take  by mouth.     amLODIPine (NORVASC) 5 mg tablet Take 5 mg by mouth daily.  ibuprofen (ADVIL) 200 mg tablet Take  by mouth. As needed.  SYMBICORT 80-4.5 mcg/actuation HFAA inhaler Take 1 Puff by inhalation daily. No current facility-administered medications for this visit. Past Medical History:   Diagnosis Date    Anxiety disorder     Asthma     exercise induced asthma    Constipation     Hypertension     Sarcoidosis     2014       Past Surgical History:   Procedure Laterality Date    HX CYST REMOVAL  finger        HX ORTHOPAEDIC  rt knee      rt arthroscopic    [place] CHEST SURGERY PROCEDURE UNLISTED      NEEDLE BX       Family History   Problem Relation Age of Onset    Cancer Mother         breast cancer    Hypertension Mother     Diabetes Father     Hypertension Father     Cancer Father         prostate cancer    Diabetes Brother         type I    Thyroid Disease Brother     Anesth Problems Neg Hx        Social History     Socioeconomic History    Marital status:      Spouse name: Not on file    Number of children: Not on file    Years of education: Not on file    Highest education level: Not on file   Occupational History    Not on file   Tobacco Use    Smoking status: Former Smoker     Types: Cigarettes     Quit date: 2014     Years since quittin.8    Smokeless tobacco: Never Used   Vaping Use    Vaping Use: Never used   Substance and Sexual Activity    Alcohol use: Yes     Comment: RARELY    Drug use: No    Sexual activity: Yes   Other Topics Concern    Not on file   Social History Narrative    . Works as a teacher. Social Determinants of Health     Financial Resource Strain:     Difficulty of Paying Living Expenses: Not on file   Food Insecurity:     Worried About Running Out of Food in the Last Year: Not on file    Robin of Food in the Last Year: Not on file   Transportation Needs:     Lack of Transportation (Medical):  Not on file    Lack of Transportation (Non-Medical): Not on file   Physical Activity:     Days of Exercise per Week: Not on file    Minutes of Exercise per Session: Not on file   Stress:     Feeling of Stress : Not on file   Social Connections:     Frequency of Communication with Friends and Family: Not on file    Frequency of Social Gatherings with Friends and Family: Not on file    Attends Shinto Services: Not on file    Active Member of [address] [place] [address] or Organizations: Not on file    Attends Club or Organization Meetings: Not on file    Marital Status: Not on file   Intimate Partner Violence:     Fear of Current or Ex-Partner: Not on file    Emotionally Abused: Not on file    Physically Abused: Not on file    Sexually Abused: Not on file   Housing Stability:     Unable to Pay for Housing in the Last Year: Not on file    Number of [place] in the Last Year: Not on file    Unstable Housing in the Last Year: Not on file           Vitals:  Ht 5' 9\" (1.753 m)   Wt 165 lb (74.8 kg)   BMI 24.37 kg/m²    Body mass index is 24.37 kg/m². ROS     Positive for: Musculoskeletal    Last edited by Joey Bullock on 2022  8:59 AM. (History)              SUBJECTIVE/OBJECTIVE:  Randy Gandhi (: 1975)   New patient presents today with complaint of right ankle pain after rolling his ankle while playing tennis on 2022. Since that time he has been experiencing some swelling in and around the ankle and mostly focused to the medial aspect of ankle where he has noticed the swelling. The lateral side does not have as much pain when compared to the medial side. He has mostly stopped playing tennis since this happened. Currently he is not experiencing severe pain and he is weightbearing okay. There is some history of injuries to this ankle in the remote past with exercise activities similar to what he was doing. He denies any major medical problems such as diabetes, heart disease, states non-smoker.   Review of his chart reveals he does have sarcoidosis, hypertension and asthma. Physical Exam  Pleasant well-nourished male , alert and oriented to person, time and place, no acute distress. Mild antalgic gait, satisfactory weightbearing stance. Right lower extremity/ankle: Calves are soft nontender, ligaments grossly stable for anterior drawer lateral talar tilt stress, there is tenderness along the medial deltoid ligament, minimal at the medial malleolus, minimal at the anterior talofibular ligament, no tenderness at the lateral malleolus. Achilles tendon intact with negative Acharya test, negative ankle squeeze test.    Right foot: No severe malalignment or deformity, hindfoot is neutral with weightbearing stance, foot itself at the hindfoot, midfoot and forefoot nontender. Able to flex and extend toes satisfactory motion strength 5/5. Contralateral foot and ankle exam, nontender, no swelling ligaments grossly stable. Normal weightbearing stance. Neurovascular exam intact for light touch sensation, cap refill, dorsalis pedis pulse palpable, flexion/extension strength 5/5. Skin intact without open wounds, lesions or ulcers, no skin discolorations, normal warmth to skin. Imaging:    XR Results (most recent):  Results from Appointment encounter on 06/14/22    XR STANDING ANKLE RT MIN 3 V    Narrative  Right ankle standing AP, lateral and oblique x-rays show no acute fractures or dislocations although there is an avulsion type bone fragment at the medial malleolus, it also does not appear acute but more likely an older fracture. There is minimal soft tissue swelling, normal bone density. An electronic signature was used to authenticate this note.   -- Jonny Flood MD

## 2022-12-08 ENCOUNTER — OFFICE VISIT (OUTPATIENT)
Dept: ORTHOPEDIC SURGERY | Age: 47
End: 2022-12-08
Payer: COMMERCIAL

## 2022-12-08 VITALS — BODY MASS INDEX: 24.88 KG/M2 | WEIGHT: 168 LBS | HEIGHT: 69 IN

## 2022-12-08 DIAGNOSIS — M51.36 BULGE OF LUMBAR DISC WITHOUT MYELOPATHY: ICD-10-CM

## 2022-12-08 DIAGNOSIS — M54.16 LUMBAR RADICULITIS: ICD-10-CM

## 2022-12-08 DIAGNOSIS — M50.30 BULGE OF CERVICAL DISC WITHOUT MYELOPATHY: ICD-10-CM

## 2022-12-08 DIAGNOSIS — M54.50 LUMBAR BACK PAIN: ICD-10-CM

## 2022-12-08 DIAGNOSIS — M54.2 NECK PAIN: Primary | ICD-10-CM

## 2022-12-08 RX ORDER — DICLOFENAC SODIUM 75 MG/1
75 TABLET, DELAYED RELEASE ORAL 2 TIMES DAILY
Qty: 60 TABLET | Refills: 0 | Status: SHIPPED | OUTPATIENT
Start: 2022-12-08

## 2022-12-08 RX ORDER — METHYLPREDNISOLONE 4 MG/1
TABLET ORAL
Qty: 1 DOSE PACK | Refills: 0 | Status: SHIPPED | OUTPATIENT
Start: 2022-12-08

## 2022-12-08 NOTE — PROGRESS NOTES
Alecia Morris (: 1975) is a 56 y.o. male, new  patient, here for evaluation of the following chief complaint(s):  LOW BACK PAIN and Neck Pain         SUBJECTIVE/OBJECTIVE:    Alecia Morris (: 1975) is a 56 y.o. male who presents for evaluation of lumbar back and periscapular pain. The patient states back pain has been present for the past 2 years with increasing severity over the last 2 to 3 months. Patient states left periscapular pain which has been worsening over the past 1 month. Patient describes the pain as sharp, severe and shooting. Describes pain radiating to the left thigh. Patient localizes pain in the lumbar region patient states he has been participating in outpatient physical therapy mainly for his knee. Patient denies upper or lower extremity numbness, tingling or weakness. The symptoms are worse with walking, since prolonged sitting and standing. Patient has been doing home exercise program and receives massage therapy once or twice a month for pain relief. States he takes 2 Advil twice daily for pain relief and rates his pain level as a 3/10 at this time. States that other times his pain level can be severe. The patient denies saddle paraesthesias/ anaesthesia. Pain Assessment  2022   Location of Pain Neck;Back   Location Modifiers Posterior; Left   Severity of Pain 4          ROS    The patient denies fevers, chills, chest pain, shortness of breath, nausea, vomiting. Positive for musculoskeletal issues as described in the HPI. Vitals:  Ht 5' 9\" (1.753 m)   Wt 168 lb (76.2 kg)   BMI 24.81 kg/m²    Body mass index is 24.81 kg/m². PHYSICAL EXAM:    The patient is alert and oriented x3 and in no acute distress. Patient ambulates with a normal gait without gait aids. Sensory testing in all major nerve distributions in the lower extremities is intact and symmetric.  Manual motor testing of the major muscle groups of the lower extremities including dorsiflexion/EHL/ plantarflexion, knee flexion/extension, hip flexion/extension/abduction/ adduction is intact and symmetric in the bilateral lower extremities. Deep tendon reflexes +2/4 and symmetric in the bilateral knees and ankles. Hip range of motion is pain-free bilaterally. Negative straight leg raise bilaterally. No evidence of clonus bilaterally. Babinski's downgoing and symmetric bilaterally. Distal pulses intact and symmetric bilaterally. There is no tenderness to palpation of the thoracic, lumbar or sacral regions. Examination of the upper extremities demonstrates sensation to light touch in all major nerve distributions in the upper extremities is intact and symmetric. Manual motor testing of the major muscle groups of the upper extremities including  strength, hand intrinsics, wrist flexion/extension, biceps/triceps, deltoid intact and symmetric. Deep tendon reflexes in the biceps, triceps and brachioradialis are +2/4 and symmetric. Range of motion of the cervical spine full and painful at terminal range of motion with cervical extension and lateral cervical rotation and bending with full forward flexion chin to chest, cervical extension to 60 degrees, lateral rotation to 80 degrees bilaterally, lateral bending to 30 degrees bilaterally. Negative Radha's, negative Spurling's maneuver. There is tenderness to palpation of the left  And periscapular region. IMAGING:        XR Results (maximum last 3): Results from Appointment encounter on 12/08/22    XR SPINE CERV 4 OR 5 V    Narrative  AP, lateral, flexion and extension view digital radiographs of the cervical spine obtained in the office today reviewed with the patient demonstrates good preservation of vertebral body height and intervertebral disc height with mild disc height loss at  C6-7. Evidence of mild anterior vertebral spurring C5 and C6. Overall cervical alignment is lordotic.   There are no gross motion segments seen on flexion/extension views. There is no evidence of fracture, lytic lesion or acute bony abnormality. XR SPINE LUMB MIN 4 V    Narrative  AP, lateral, flexion and extension view digital radiographs of the lumbar spine obtained in the office today were reviewed and demonstrate good preservation of vertebral body height and intervertebral disc height. There are no gross motion segments seen on flexion/extension views. Overall lumbar alignment is lordotic. There is no evidence of fracture, lytic lesion or acute bony abnormality. MRI lumbar spine without contrast 1/12/2021   Impression:  L5-S1 mild chronic degenerative findings and associated with small broad-based posterior central disc protrusion slightly greater on the left. Mild/moderate left foraminal stenosis. L4- L5 minimal disc bulge with minimal if any stenosis. MRI was reviewed and agree with radiologist's findings. Allergies   Allergen Reactions    Other Plant, Animal, Environmental Other (comments)     Dust, mold, cockroach         Current Outpatient Medications   Medication Sig    methylPREDNISolone (MEDROL DOSEPACK) 4 mg tablet Per dose pack instructions    diclofenac EC (VOLTAREN) 75 mg EC tablet Take 1 Tablet by mouth two (2) times a day. lisinopriL (PRINIVIL, ZESTRIL) 5 mg tablet Take 1 Tablet by mouth daily. LOSARTAN PO Take  by mouth. amLODIPine (NORVASC) 5 mg tablet Take 5 mg by mouth daily. SYMBICORT 80-4.5 mcg/actuation HFAA inhaler Take 1 Puff by inhalation daily. (Patient not taking: Reported on 12/8/2022)     No current facility-administered medications for this visit.          Past Medical History:   Diagnosis Date    Anxiety disorder     Asthma     exercise induced asthma    Constipation     Hypertension     Sarcoidosis     9/2014          Past Surgical History:   Procedure Laterality Date    HX CYST REMOVAL  finger    2014    HX ORTHOPAEDIC  rt knee    2010  rt arthroscopic    [place] CHEST SURGERY PROCEDURE UNLISTED      NEEDLE BX         Family History   Problem Relation Age of Onset    Cancer Mother         breast cancer    Hypertension Mother     Diabetes Father     Hypertension Father     Cancer Father         prostate cancer    Diabetes Brother         type I    Thyroid Disease Brother     Anesth Problems Neg Hx           Social History     Tobacco Use    Smoking status: Former     Types: Cigarettes     Quit date: 2014     Years since quittin.3    Smokeless tobacco: Never   Vaping Use    Vaping Use: Never used   Substance Use Topics    Alcohol use: Yes     Comment: RARELY    Drug use: No                 ASSESSMENT/PLAN:      1. Neck pain  -     XR SPINE CERV 4 OR 5 V; Future  -     methylPREDNISolone (MEDROL DOSEPACK) 4 mg tablet; Per dose pack instructions, Normal, Disp-1 Dose Pack, R-0  -     diclofenac EC (VOLTAREN) 75 mg EC tablet; Take 1 Tablet by mouth two (2) times a day., Normal, Disp-60 Tablet, R-0  -     REFERRAL TO PHYSICAL THERAPY  2. Lumbar back pain  -     XR SPINE LUMB MIN 4 V; Future  -     methylPREDNISolone (MEDROL DOSEPACK) 4 mg tablet; Per dose pack instructions, Normal, Disp-1 Dose Pack, R-0  -     diclofenac EC (VOLTAREN) 75 mg EC tablet; Take 1 Tablet by mouth two (2) times a day., Normal, Disp-60 Tablet, R-0  -     REFERRAL TO PHYSICAL THERAPY  3. Bulge of cervical disc without myelopathy  -     methylPREDNISolone (MEDROL DOSEPACK) 4 mg tablet; Per dose pack instructions, Normal, Disp-1 Dose Pack, R-0  -     diclofenac EC (VOLTAREN) 75 mg EC tablet; Take 1 Tablet by mouth two (2) times a day., Normal, Disp-60 Tablet, R-0  -     REFERRAL TO PHYSICAL THERAPY  4. Bulge of lumbar disc without myelopathy  -     methylPREDNISolone (MEDROL DOSEPACK) 4 mg tablet; Per dose pack instructions, Normal, Disp-1 Dose Pack, R-0  -     diclofenac EC (VOLTAREN) 75 mg EC tablet; Take 1 Tablet by mouth two (2) times a day., Normal, Disp-60 Tablet, R-0  -     REFERRAL TO PHYSICAL THERAPY  5.  Lumbar radiculitis  -     methylPREDNISolone (MEDROL DOSEPACK) 4 mg tablet; Per dose pack instructions, Normal, Disp-1 Dose Pack, R-0  -     diclofenac EC (VOLTAREN) 75 mg EC tablet; Take 1 Tablet by mouth two (2) times a day., Normal, Disp-60 Tablet, R-0  -     REFERRAL TO PHYSICAL THERAPY      Below is the assessment and plan developed based on review of pertinent history, physical exam, labs, studies, and medications. Have discussed the patients diagnosis and radiographic findings at length and have answered all patient questions to her questions to his satisfaction. Have sent a prescription for a steroid taper and an anti-inflammatory electronically to the patient's preferred pharmacy. Have provided the patient with a prescription for outpatient physical therapy for core strengthening, modalities and instruction in a home exercise program.  Will plan on seeing the patient back for reevaluation in 4 to 6 weeks time should symptoms persist or worsen. The patient understands and agrees to the treatment plan as outlined above. No follow-ups on file. Dr. Kayleigh Stark was available for immediate consult during this encounter. An electronic signature was used to authenticate this note.     -- Clint Guajardo PA-C

## 2023-03-24 ENCOUNTER — OFFICE VISIT (OUTPATIENT)
Dept: ORTHOPEDIC SURGERY | Age: 48
End: 2023-03-24

## 2023-03-24 VITALS — WEIGHT: 165 LBS | HEIGHT: 69 IN | BODY MASS INDEX: 24.44 KG/M2

## 2023-03-24 DIAGNOSIS — S69.82XA TFC (TRIANGULAR FIBROCARTILAGE COMPLEX) INJURY, LEFT, INITIAL ENCOUNTER: ICD-10-CM

## 2023-03-24 DIAGNOSIS — M79.642 LEFT HAND PAIN: Primary | ICD-10-CM

## 2023-03-24 DIAGNOSIS — M67.342: ICD-10-CM

## 2023-03-24 RX ORDER — MELOXICAM 15 MG/1
15 TABLET ORAL DAILY
Qty: 30 TABLET | Refills: 0 | Status: SHIPPED | OUTPATIENT
Start: 2023-03-24 | End: 2023-04-23

## 2023-03-24 RX ORDER — CHOLECALCIFEROL (VITAMIN D3) 125 MCG
CAPSULE ORAL
COMMUNITY

## 2023-03-24 NOTE — PROGRESS NOTES
Rosalva Brooks (: 1975) is a 56 y.o. male patient here for evaluation of the following chief complaint(s):  Thumb Pain (left thumb) and Wrist Pain (Left wrist)       ASSESSMENT/PLAN:  Below is the assessment and plan developed based on review of pertinent history, physical exam, labs, studies, and medications. 1. Left hand pain  -     XR HAND LT MIN 3 V; Future  -     REFERRAL TO [facility] [place]  -     WRIST COCK-UP NON-MOLDED  2. Transient synovitis of left hand  -     meloxicam (Mobic) 15 mg tablet; Take 1 Tablet by mouth daily for 30 days. Take with food, Normal, Disp-30 Tablet, R-0  3. TFC (triangular fibrocartilage complex) injury, left, initial encounter  -     meloxicam (Mobic) 15 mg tablet; Take 1 Tablet by mouth daily for 30 days. Take with food, Normal, Disp-30 Tablet, R-0      Patient may have a TFC injury but there was no trauma this could be chronic. Exam is consistent with an injury to his TFC. Additionally the pain in his left thumb IP joint and left index finger PIP is consistent with synovitis and overuse as he was doing the same thing over and over with the piano. For now recommended anti-inflammatory, ice, activity modification and follow-up in 6 weeks. Reevaluation at that time. Could consider MRI of the wrist if wrist pain is persistent or injection with steroid. Prognosis is uncertain at this time. We will see how he responds to the NSAID. Patient verbalized understanding and elected to proceed. All questions were answered to the patient's apparent satisfaction. SUBJECTIVE/OBJECTIVE:  HPI    52-year-old male with left hand and wrist pain. 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 He really has not had a lot of relief over the last month.   Additionally he has ulnar-sided left wrist pain. No trauma. Patient reports a gradual onset of symptoms. Duration of problem 1 month, 2023. Symptom Severity 4/10  Symptom Frequency constant        Allergies   Allergen Reactions    Other Plant, Animal, Environmental Other (comments)     Dust, mold, cockroach       Current Outpatient Medications   Medication Sig    cholecalciferol, vitamin D3, 50 mcg (2,000 unit) tab 1 tablet Orally Once a day for 30 day(s)    meloxicam (Mobic) 15 mg tablet Take 1 Tablet by mouth daily for 30 days. Take with food    lisinopriL (PRINIVIL, ZESTRIL) 5 mg tablet Take 1 Tablet by mouth daily. amLODIPine (NORVASC) 5 mg tablet Take 5 mg by mouth daily. SYMBICORT 80-4.5 mcg/actuation HFAA inhaler Take 1 Puff by inhalation daily. (Patient not taking: No sig reported)     No current facility-administered medications for this visit. Social History     Socioeconomic History    Marital status:      Spouse name: Not on file    Number of children: Not on file    Years of education: Not on file    Highest education level: Not on file   Occupational History    Not on file   Tobacco Use    Smoking status: Former     Types: Cigarettes     Quit date: 2014     Years since quittin.6    Smokeless tobacco: Never   Vaping Use    Vaping Use: Never used   Substance and Sexual Activity    Alcohol use: Yes     Comment: RARELY    Drug use: No    Sexual activity: Yes     Partners: Female   Other Topics Concern    Not on file   Social History Narrative    . Works as a teacher.      Social Determinants of Health     Financial Resource Strain: Not on file   Food Insecurity: Not on file   Transportation Needs: Not on file   Physical Activity: Not on file   Stress: Not on file   Social Connections: Not on file   Intimate Partner Violence: Not on file   Housing Stability: Not on file       Past Surgical History:   Procedure Laterality Date    HX CYST REMOVAL  finger        HX ORTHOPAEDIC  rt knee    2010  rt arthroscopic    [place] UNLISTED PROCEDURE LUNGS & PLEURA      NEEDLE BX       Family History   Problem Relation Age of Onset    Cancer Mother         breast cancer    Hypertension Mother     Diabetes Father     Hypertension Father     Cancer Father         prostate cancer    Diabetes Brother         type I    Thyroid Disease Brother     Anesth Problems Neg Hx             Review of Systems    Pain Assessment  12/8/2022   Location of Pain Neck;Back   Location Modifiers Posterior; Left   Severity of Pain 4           Vitals:  Ht 5' 9\" (1.753 m)   Wt 165 lb (74.8 kg)   BMI 24.37 kg/m²    Estimated body surface area is 1.91 meters squared as calculated from the following:    Height as of this encounter: 5' 9\" (1.753 m). Weight as of this encounter: 165 lb (74.8 kg). Body mass index is 24.37 kg/m². Physical Exam    Musculoskeletal Exam:    Left WRIST EXAM    SL Interval TTP: Negative    Snuffbox TTP: Negative    Chen's Test: Negative    PT Shear: Negative    DRUJ TTP: Positive    TFCC TTP: Positive    ECU TTP: Negative    No obvious deformity noted to the index or thumb. Localized minimal tenderness at the PIP of the index and IP of the thumb. Patient fires AIN, PIN and ulnar nerves. Sensation is grossly intact in the median, radial and ulnar distribution. Hand is pink and appears well-perfused. Hand is warm. Skin is intact. Compartments are soft and compressible.     Consitutional: Healthy  Skin:   - Edema - none  - Cellulitis - No    Neuro: Numbness or tingling in R/L arm: No    Psych: Affect normal    Cardiovascular: Capillary Refill < 2 seconds in upper extremities    Respiratory: Non-Labored Breathing    ROS:    Constitutional: Denies fever/chills    Respiratory: Denies SOB        Imaging:    XR Results (most recent):  Results from Appointment encounter on 03/24/23    XR HAND LT MIN 3 V    Narrative  Left Hand Xray  Indication: pain  Views: 3 views, AP/LAT/OBL    Interpretation: 3 views of the left hand are reviewed and show no acute osseous abnormalities. There are no fractures or dislocations noted. The carpal alignment is well-maintained with no obvious abnormalities noted. There is no evidence indicating obvious ligament tear noted on these x-rays. Normal bone architecture is noted. No evidence of soft tissue swelling is noted. There is some mild DRUJ arthritis noted with osteophyte formation. Orders Placed This Encounter    XR HAND LT MIN 3 V     Long hand     Standing Status:   Future     Number of Occurrences:   1     Standing Expiration Date:   3/24/2024    [facility], [facility] - REFERRAL TO DME [[id number]]     Referral Priority:   Routine     Referral Type:   Consultation     Referral Reason:   Specialty Services Required     Referred to Provider:   Jenny West MD     Number of Visits Requested:   1    WRIST BRACE 8\"     Left wrist brace    meloxicam (Mobic) 15 mg tablet     Sig: Take 1 Tablet by mouth daily for 30 days. Take with food     Dispense:  30 Tablet     Refill:  0            An electronic signature was used to authenticate this note.   -- Allan Klinefelter, MD

## 2023-04-20 DIAGNOSIS — M67.342: ICD-10-CM

## 2023-04-20 DIAGNOSIS — S69.82XA TFC (TRIANGULAR FIBROCARTILAGE COMPLEX) INJURY, LEFT, INITIAL ENCOUNTER: ICD-10-CM

## 2023-04-20 RX ORDER — MELOXICAM 15 MG/1
15 TABLET ORAL DAILY
Qty: 30 TABLET | Refills: 0 | Status: SHIPPED | OUTPATIENT
Start: 2023-04-20 | End: 2023-05-20

## 2023-06-08 ENCOUNTER — HOSPITAL ENCOUNTER (OUTPATIENT)
Facility: HOSPITAL | Age: 48
End: 2023-06-08
Attending: ORTHOPAEDIC SURGERY
Payer: COMMERCIAL

## 2023-06-08 ENCOUNTER — HOSPITAL ENCOUNTER (OUTPATIENT)
Facility: HOSPITAL | Age: 48
Discharge: HOME | End: 2023-06-08
Attending: ORTHOPAEDIC SURGERY
Payer: COMMERCIAL

## 2023-06-08 DIAGNOSIS — S69.82XD: ICD-10-CM

## 2023-06-08 PROCEDURE — A9579 GAD-BASE MR CONTRAST NOS,1ML: HCPCS | Performed by: RADIOLOGY

## 2023-06-08 PROCEDURE — 73222 MRI JOINT UPR EXTREM W/DYE: CPT

## 2023-06-08 PROCEDURE — 2500000003 HC RX 250 WO HCPCS: Performed by: RADIOLOGY

## 2023-06-08 PROCEDURE — 73115 CONTRAST X-RAY OF WRIST: CPT

## 2023-06-08 PROCEDURE — 6360000004 HC RX CONTRAST MEDICATION: Performed by: RADIOLOGY

## 2023-06-08 RX ORDER — LIDOCAINE HYDROCHLORIDE 10 MG/ML
2 INJECTION, SOLUTION EPIDURAL; INFILTRATION; INTRACAUDAL; PERINEURAL ONCE
Status: COMPLETED | OUTPATIENT
Start: 2023-06-08 | End: 2023-06-08

## 2023-06-08 RX ADMIN — LIDOCAINE HYDROCHLORIDE 2 ML: 10 INJECTION, SOLUTION EPIDURAL; INFILTRATION; INTRACAUDAL; PERINEURAL at 14:00

## 2023-06-08 RX ADMIN — GADOTERIDOL 1 ML: 279.3 INJECTION, SOLUTION INTRAVENOUS at 13:58

## 2023-06-08 RX ADMIN — IOHEXOL 5 ML: 300 INJECTION, SOLUTION INTRAVENOUS at 13:59

## 2023-10-05 ENCOUNTER — PROCEDURE VISIT (OUTPATIENT)
Age: 48
End: 2023-10-05
Payer: COMMERCIAL

## 2023-10-05 DIAGNOSIS — G56.22 NEUROPATHY, ULNAR AT ELBOW, LEFT: Primary | ICD-10-CM

## 2023-10-05 PROCEDURE — 95886 MUSC TEST DONE W/N TEST COMP: CPT | Performed by: PSYCHIATRY & NEUROLOGY

## 2023-10-05 PROCEDURE — 95910 NRV CNDJ TEST 7-8 STUDIES: CPT | Performed by: PSYCHIATRY & NEUROLOGY

## 2023-10-27 ENCOUNTER — HOSPITAL ENCOUNTER (OUTPATIENT)
Facility: HOSPITAL | Age: 48
Discharge: HOME | End: 2023-10-27
Attending: ORTHOPAEDIC SURGERY
Payer: COMMERCIAL

## 2023-10-27 DIAGNOSIS — M25.551 RIGHT HIP PAIN: ICD-10-CM

## 2023-10-27 PROCEDURE — 6360000004 HC RX CONTRAST MEDICATION: Performed by: RADIOLOGY

## 2023-10-27 PROCEDURE — 6360000002 HC RX W HCPCS: Performed by: RADIOLOGY

## 2023-10-27 PROCEDURE — 20610 DRAIN/INJ JOINT/BURSA W/O US: CPT

## 2023-10-27 RX ORDER — BUPIVACAINE HYDROCHLORIDE 5 MG/ML
5 INJECTION, SOLUTION EPIDURAL; INTRACAUDAL ONCE
Status: COMPLETED | OUTPATIENT
Start: 2023-10-27 | End: 2023-10-27

## 2023-10-27 RX ORDER — TRIAMCINOLONE ACETONIDE 40 MG/ML
40 INJECTION, SUSPENSION INTRA-ARTICULAR; INTRAMUSCULAR ONCE
Status: COMPLETED | OUTPATIENT
Start: 2023-10-27 | End: 2023-10-27

## 2023-10-27 RX ADMIN — TRIAMCINOLONE ACETONIDE 40 MG: 40 INJECTION, SUSPENSION INTRA-ARTICULAR; INTRAMUSCULAR at 12:11

## 2023-10-27 RX ADMIN — IOHEXOL 3 ML: 180 INJECTION INTRAVENOUS at 12:11

## 2023-10-27 RX ADMIN — BUPIVACAINE HYDROCHLORIDE 25 MG: 5 INJECTION, SOLUTION EPIDURAL; INTRACAUDAL; PERINEURAL at 12:10

## 2024-04-01 ENCOUNTER — HOSPITAL ENCOUNTER (OUTPATIENT)
Facility: HOSPITAL | Age: 49
Discharge: HOME | End: 2024-04-04
Payer: COMMERCIAL

## 2024-04-01 DIAGNOSIS — R13.10 DYSPHAGIA, UNSPECIFIED TYPE: ICD-10-CM

## 2024-04-01 PROCEDURE — 92611 MOTION FLUOROSCOPY/SWALLOW: CPT

## 2024-04-01 PROCEDURE — 74230 X-RAY XM SWLNG FUNCJ C+: CPT

## 2024-04-01 NOTE — PROGRESS NOTES
[facility]  SPEECH PATHOLOGY MODIFIED BARIUM SWALLOW STUDY  Patient: Keith Antoine (48 y.o. male)  Date: 4/1/2024  Referring Provider:  VEL Carrion    SUBJECTIVE:   Patient reports some solid dysphagia with choking on foods and sometimes drinks recently since he finished radiation tx for laryngeal CA.   XRT from Feb-March 18th 2024.   He reports he has had dysphonia for 1.5 years.    Laryngeal CA dx by Dr. Dobson.   XRT: Dr. Johnathan Castellano      OBJECTIVE:   Past Medical History:   Past Medical History:   Diagnosis Date    Anxiety disorder     Asthma     exercise induced asthma    Constipation     Hypertension     Sarcoidosis     9/2014     Past Surgical History:   Procedure Laterality Date    CHEST SURGERY      NEEDLE BX    CYST REMOVAL  finger    2014    ORTHOPEDIC SURGERY  rt knee    2010  rt arthroscopic     Current Dietary Status:  regular, thins  Type of Study: Modified barium swallow  Film Views: Lateral  Radiologist: Harper  Patient Position: standing upright      Trial 1: Trial 2:   Consistencies Administered:  (thin, pudding , cracker, barium pill)     How Presented: Spoon;Straw;Successive Swallows;Self-fed/presented  ORAL PHASE:      Bolus Acceptance: No impairment     Bolus Formation/Control: No impairment       Propulsion: No impairment       Oral Residue: None      PHARYNGEAL PHASE:      Timing: No impairment     Penetration: None     Aspiration/Timing: No evidence of aspiration     Pharyngeal Clearance: Pyriform residue;Less than 10% (with solids and purees. appeared to be due to tight UES)     Attempted Modifications: Alternate liquids/solids             Trial 3: Trial 4:                                                                                       Swallowing Physiology  Decreased Tongue Base Retraction?: No  Laryngeal Elevation: WFL (within functional limits)  Penetration-Aspiration Scale (PAS): 1 - Material does not enter the airway  Pharyngeal Symmetry: Not

## 2025-08-24 ENCOUNTER — APPOINTMENT (OUTPATIENT)
Facility: HOSPITAL | Age: 50
DRG: 072 | End: 2025-08-24
Payer: COMMERCIAL

## 2025-08-24 ENCOUNTER — HOSPITAL ENCOUNTER (INPATIENT)
Facility: HOSPITAL | Age: 50
LOS: 1 days | Discharge: HOME | DRG: 072 | End: 2025-08-25
Admitting: STUDENT IN AN ORGANIZED HEALTH CARE EDUCATION/TRAINING PROGRAM
Payer: COMMERCIAL

## 2025-08-24 DIAGNOSIS — R41.82 ALTERED MENTAL STATUS, UNSPECIFIED ALTERED MENTAL STATUS TYPE: Primary | ICD-10-CM

## 2025-08-24 DIAGNOSIS — I63.9 CEREBROVASCULAR ACCIDENT (CVA), UNSPECIFIED MECHANISM (HCC): ICD-10-CM

## 2025-08-24 DIAGNOSIS — R41.3 AMNESIA: ICD-10-CM

## 2025-08-24 LAB
ALBUMIN SERPL-MCNC: 4.1 G/DL (ref 3.5–5.2)
ALBUMIN/GLOB SERPL: 1.2 (ref 1.1–2.2)
ALP SERPL-CCNC: 52 U/L (ref 40–129)
ALT SERPL-CCNC: 12 U/L (ref 10–50)
AMPHET UR QL SCN: NEGATIVE
ANION GAP SERPL CALC-SCNC: 14 MMOL/L (ref 2–14)
APPEARANCE UR: CLEAR
AST SERPL-CCNC: 25 U/L (ref 10–50)
BACTERIA URNS QL MICRO: NEGATIVE /HPF
BARBITURATES UR QL SCN: NEGATIVE
BASOPHILS # BLD: 0.03 K/UL (ref 0–0.1)
BASOPHILS NFR BLD: 0.7 % (ref 0–1)
BENZODIAZ UR QL: NEGATIVE
BILIRUB SERPL-MCNC: 0.8 MG/DL (ref 0–1.2)
BILIRUB UR QL: NEGATIVE
BUN SERPL-MCNC: 28 MG/DL (ref 6–20)
BUN/CREAT SERPL: 22 (ref 12–20)
CALCIUM SERPL-MCNC: 9.8 MG/DL (ref 8.6–10)
CANNABINOIDS UR QL SCN: NEGATIVE
CHLORIDE SERPL-SCNC: 102 MMOL/L (ref 98–107)
CHOLEST SERPL-MCNC: 192 MG/DL (ref 0–200)
CO2 SERPL-SCNC: 23 MMOL/L (ref 20–29)
COCAINE UR QL SCN: NEGATIVE
COLOR UR: ABNORMAL
CREAT SERPL-MCNC: 1.29 MG/DL (ref 0.7–1.2)
DIFFERENTIAL METHOD BLD: NORMAL
EKG ATRIAL RATE: 77 BPM
EKG DIAGNOSIS: NORMAL
EKG P AXIS: 23 DEGREES
EKG P-R INTERVAL: 180 MS
EKG Q-T INTERVAL: 386 MS
EKG QRS DURATION: 76 MS
EKG QTC CALCULATION (BAZETT): 436 MS
EKG R AXIS: -17 DEGREES
EKG T AXIS: 62 DEGREES
EKG VENTRICULAR RATE: 77 BPM
EOSINOPHIL # BLD: 0.27 K/UL (ref 0–0.4)
EOSINOPHIL NFR BLD: 6.5 % (ref 0–7)
EPITH CASTS URNS QL MICRO: ABNORMAL /LPF
ERYTHROCYTE [DISTWIDTH] IN BLOOD BY AUTOMATED COUNT: 12.1 % (ref 11.5–14.5)
EST. AVERAGE GLUCOSE BLD GHB EST-MCNC: 100 MG/DL
ETHANOL SERPL-MCNC: <11 MG/DL (ref 0–0.08)
FENTANYL: NEGATIVE
GLOBULIN SER CALC-MCNC: 3.4 G/DL (ref 2–4)
GLUCOSE BLD STRIP.AUTO-MCNC: 93 MG/DL (ref 65–117)
GLUCOSE SERPL-MCNC: 93 MG/DL (ref 65–100)
GLUCOSE UR STRIP.AUTO-MCNC: NEGATIVE MG/DL
HBA1C MFR BLD: 5.1 % (ref 4–5.6)
HCT VFR BLD AUTO: 49.3 % (ref 36.6–50.3)
HDLC SERPL-MCNC: 42 MG/DL (ref 40–60)
HDLC SERPL: 4.6 (ref 0–5)
HGB BLD-MCNC: 16.3 G/DL (ref 12.1–17)
HGB UR QL STRIP: NEGATIVE
HYALINE CASTS URNS QL MICRO: ABNORMAL /LPF (ref 0–2)
IMM GRANULOCYTES # BLD AUTO: 0.01 K/UL (ref 0–0.04)
IMM GRANULOCYTES NFR BLD AUTO: 0.2 % (ref 0–0.5)
INR PPP: 1 (ref 0.9–1.1)
KETONES UR QL STRIP.AUTO: ABNORMAL MG/DL
LACTATE SERPL-SCNC: 1.1 MMOL/L (ref 0.5–2)
LDLC SERPL CALC-MCNC: 131 MG/DL
LEUKOCYTE ESTERASE UR QL STRIP.AUTO: NEGATIVE
LIPASE SERPL-CCNC: 28 U/L (ref 13–60)
LYMPHOCYTES # BLD: 0.85 K/UL (ref 0.8–3.5)
LYMPHOCYTES NFR BLD: 20.5 % (ref 12–49)
Lab: NORMAL
MAGNESIUM SERPL-MCNC: 2 MG/DL (ref 1.6–2.6)
MCH RBC QN AUTO: 29.5 PG (ref 26–34)
MCHC RBC AUTO-ENTMCNC: 33.1 G/DL (ref 30–36.5)
MCV RBC AUTO: 89.3 FL (ref 80–99)
METHADONE UR QL: NEGATIVE
MONOCYTES # BLD: 0.38 K/UL (ref 0–1)
MONOCYTES NFR BLD: 9.2 % (ref 5–13)
NEUTS SEG # BLD: 2.6 K/UL (ref 1.8–8)
NEUTS SEG NFR BLD: 62.9 % (ref 32–75)
NITRITE UR QL STRIP.AUTO: NEGATIVE
NRBC # BLD: 0 K/UL (ref 0–0.01)
NRBC BLD-RTO: 0 PER 100 WBC
NT PRO BNP: 56 PG/ML (ref 0–125)
OPIATES UR QL: NEGATIVE
PCP UR QL: NEGATIVE
PH UR STRIP: 5.5 (ref 5–8)
PLATELET # BLD AUTO: 187 K/UL (ref 150–400)
PMV BLD AUTO: 10.2 FL (ref 8.9–12.9)
POTASSIUM SERPL-SCNC: 4 MMOL/L (ref 3.5–5.1)
PROT SERPL-MCNC: 7.5 G/DL (ref 6.4–8.3)
PROT UR STRIP-MCNC: NEGATIVE MG/DL
PROTHROMBIN TIME: 10.9 SEC (ref 9.2–11.2)
RBC # BLD AUTO: 5.52 M/UL (ref 4.1–5.7)
RBC #/AREA URNS HPF: ABNORMAL /HPF (ref 0–5)
SERVICE CMNT-IMP: NORMAL
SODIUM SERPL-SCNC: 139 MMOL/L (ref 136–145)
T4 FREE SERPL-MCNC: 1.2 NG/DL (ref 0.9–1.6)
T4 FREE SERPL-MCNC: 1.2 NG/DL (ref 0.9–1.6)
TRIGL SERPL-MCNC: 96 MG/DL (ref 0–150)
TROPONIN T SERPL HS-MCNC: 36.1 NG/L (ref 0–22)
TROPONIN T SERPL HS-MCNC: 7.6 NG/L (ref 0–22)
TSH, 3RD GENERATION: 2.03 UIU/ML (ref 0.27–4.2)
TSH, 3RD GENERATION: 2.07 UIU/ML (ref 0.27–4.2)
URINE CULTURE IF INDICATED: ABNORMAL
UROBILINOGEN UR QL STRIP.AUTO: 0.2 EU/DL (ref 0.2–1)
VLDLC SERPL CALC-MCNC: 19 MG/DL
WBC # BLD AUTO: 4.1 K/UL (ref 4.1–11.1)
WBC URNS QL MICRO: ABNORMAL /HPF (ref 0–4)

## 2025-08-24 PROCEDURE — 70498 CT ANGIOGRAPHY NECK: CPT

## 2025-08-24 PROCEDURE — 84443 ASSAY THYROID STIM HORMONE: CPT

## 2025-08-24 PROCEDURE — 6360000004 HC RX CONTRAST MEDICATION

## 2025-08-24 PROCEDURE — 85025 COMPLETE CBC W/AUTO DIFF WBC: CPT

## 2025-08-24 PROCEDURE — 1100000000 HC RM PRIVATE

## 2025-08-24 PROCEDURE — 83605 ASSAY OF LACTIC ACID: CPT

## 2025-08-24 PROCEDURE — 83880 ASSAY OF NATRIURETIC PEPTIDE: CPT

## 2025-08-24 PROCEDURE — 81001 URINALYSIS AUTO W/SCOPE: CPT

## 2025-08-24 PROCEDURE — 83690 ASSAY OF LIPASE: CPT

## 2025-08-24 PROCEDURE — 93005 ELECTROCARDIOGRAM TRACING: CPT

## 2025-08-24 PROCEDURE — 71045 X-RAY EXAM CHEST 1 VIEW: CPT

## 2025-08-24 PROCEDURE — 80061 LIPID PANEL: CPT

## 2025-08-24 PROCEDURE — 82962 GLUCOSE BLOOD TEST: CPT

## 2025-08-24 PROCEDURE — 70450 CT HEAD/BRAIN W/O DYE: CPT

## 2025-08-24 PROCEDURE — 85610 PROTHROMBIN TIME: CPT

## 2025-08-24 PROCEDURE — 99285 EMERGENCY DEPT VISIT HI MDM: CPT

## 2025-08-24 PROCEDURE — 6360000002 HC RX W HCPCS: Performed by: STUDENT IN AN ORGANIZED HEALTH CARE EDUCATION/TRAINING PROGRAM

## 2025-08-24 PROCEDURE — 84484 ASSAY OF TROPONIN QUANT: CPT

## 2025-08-24 PROCEDURE — 70551 MRI BRAIN STEM W/O DYE: CPT

## 2025-08-24 PROCEDURE — 83036 HEMOGLOBIN GLYCOSYLATED A1C: CPT

## 2025-08-24 PROCEDURE — 80053 COMPREHEN METABOLIC PANEL: CPT

## 2025-08-24 PROCEDURE — 83735 ASSAY OF MAGNESIUM: CPT

## 2025-08-24 PROCEDURE — 6370000000 HC RX 637 (ALT 250 FOR IP): Performed by: STUDENT IN AN ORGANIZED HEALTH CARE EDUCATION/TRAINING PROGRAM

## 2025-08-24 PROCEDURE — 2580000003 HC RX 258

## 2025-08-24 PROCEDURE — 2500000003 HC RX 250 WO HCPCS: Performed by: STUDENT IN AN ORGANIZED HEALTH CARE EDUCATION/TRAINING PROGRAM

## 2025-08-24 PROCEDURE — 84439 ASSAY OF FREE THYROXINE: CPT

## 2025-08-24 PROCEDURE — 82077 ASSAY SPEC XCP UR&BREATH IA: CPT

## 2025-08-24 PROCEDURE — 36415 COLL VENOUS BLD VENIPUNCTURE: CPT

## 2025-08-24 RX ORDER — ONDANSETRON 4 MG/1
4 TABLET, ORALLY DISINTEGRATING ORAL EVERY 8 HOURS PRN
Status: DISCONTINUED | OUTPATIENT
Start: 2025-08-24 | End: 2025-08-25 | Stop reason: HOSPADM

## 2025-08-24 RX ORDER — ONDANSETRON 2 MG/ML
4 INJECTION INTRAMUSCULAR; INTRAVENOUS EVERY 6 HOURS PRN
Status: DISCONTINUED | OUTPATIENT
Start: 2025-08-24 | End: 2025-08-25

## 2025-08-24 RX ORDER — ASPIRIN 300 MG/1
300 SUPPOSITORY RECTAL DAILY
Status: DISCONTINUED | OUTPATIENT
Start: 2025-08-24 | End: 2025-08-25 | Stop reason: HOSPADM

## 2025-08-24 RX ORDER — POLYETHYLENE GLYCOL 3350 17 G/17G
17 POWDER, FOR SOLUTION ORAL DAILY PRN
Status: DISCONTINUED | OUTPATIENT
Start: 2025-08-24 | End: 2025-08-25 | Stop reason: HOSPADM

## 2025-08-24 RX ORDER — AMLODIPINE BESYLATE 5 MG/1
5 TABLET ORAL DAILY
Status: DISCONTINUED | OUTPATIENT
Start: 2025-08-24 | End: 2025-08-25 | Stop reason: HOSPADM

## 2025-08-24 RX ORDER — ATORVASTATIN CALCIUM 40 MG/1
80 TABLET, FILM COATED ORAL NIGHTLY
Status: DISCONTINUED | OUTPATIENT
Start: 2025-08-24 | End: 2025-08-25 | Stop reason: HOSPADM

## 2025-08-24 RX ORDER — SODIUM CHLORIDE 9 MG/ML
INJECTION, SOLUTION INTRAVENOUS PRN
Status: DISCONTINUED | OUTPATIENT
Start: 2025-08-24 | End: 2025-08-25 | Stop reason: HOSPADM

## 2025-08-24 RX ORDER — CETIRIZINE HYDROCHLORIDE 10 MG/1
5 TABLET ORAL DAILY
Status: DISCONTINUED | OUTPATIENT
Start: 2025-08-24 | End: 2025-08-25 | Stop reason: HOSPADM

## 2025-08-24 RX ORDER — ONDANSETRON 2 MG/ML
4 INJECTION INTRAMUSCULAR; INTRAVENOUS EVERY 6 HOURS PRN
Status: DISCONTINUED | OUTPATIENT
Start: 2025-08-24 | End: 2025-08-25 | Stop reason: HOSPADM

## 2025-08-24 RX ORDER — LISINOPRIL 5 MG/1
5 TABLET ORAL DAILY
Status: DISCONTINUED | OUTPATIENT
Start: 2025-08-24 | End: 2025-08-25 | Stop reason: HOSPADM

## 2025-08-24 RX ORDER — 0.9 % SODIUM CHLORIDE 0.9 %
1000 INTRAVENOUS SOLUTION INTRAVENOUS ONCE
Status: COMPLETED | OUTPATIENT
Start: 2025-08-24 | End: 2025-08-24

## 2025-08-24 RX ORDER — VITAMIN B COMPLEX
2000 TABLET ORAL DAILY
Status: DISCONTINUED | OUTPATIENT
Start: 2025-08-24 | End: 2025-08-25 | Stop reason: HOSPADM

## 2025-08-24 RX ORDER — SODIUM CHLORIDE 0.9 % (FLUSH) 0.9 %
5-40 SYRINGE (ML) INJECTION PRN
Status: DISCONTINUED | OUTPATIENT
Start: 2025-08-24 | End: 2025-08-25 | Stop reason: HOSPADM

## 2025-08-24 RX ORDER — IOPAMIDOL 755 MG/ML
100 INJECTION, SOLUTION INTRAVASCULAR
Status: COMPLETED | OUTPATIENT
Start: 2025-08-24 | End: 2025-08-24

## 2025-08-24 RX ORDER — ACETAMINOPHEN 325 MG/1
650 TABLET ORAL EVERY 4 HOURS PRN
Status: DISCONTINUED | OUTPATIENT
Start: 2025-08-24 | End: 2025-08-25 | Stop reason: HOSPADM

## 2025-08-24 RX ORDER — ENOXAPARIN SODIUM 100 MG/ML
40 INJECTION SUBCUTANEOUS DAILY
Status: DISCONTINUED | OUTPATIENT
Start: 2025-08-24 | End: 2025-08-25 | Stop reason: HOSPADM

## 2025-08-24 RX ORDER — ASPIRIN 81 MG/1
81 TABLET, CHEWABLE ORAL DAILY
Status: DISCONTINUED | OUTPATIENT
Start: 2025-08-24 | End: 2025-08-25 | Stop reason: HOSPADM

## 2025-08-24 RX ORDER — IBUPROFEN 600 MG/1
600 TABLET, FILM COATED ORAL EVERY 6 HOURS PRN
Status: DISCONTINUED | OUTPATIENT
Start: 2025-08-24 | End: 2025-08-25 | Stop reason: HOSPADM

## 2025-08-24 RX ORDER — SODIUM CHLORIDE 0.9 % (FLUSH) 0.9 %
5-40 SYRINGE (ML) INJECTION EVERY 12 HOURS SCHEDULED
Status: DISCONTINUED | OUTPATIENT
Start: 2025-08-24 | End: 2025-08-25 | Stop reason: HOSPADM

## 2025-08-24 RX ADMIN — CETIRIZINE HYDROCHLORIDE 5 MG: 10 TABLET ORAL at 20:38

## 2025-08-24 RX ADMIN — ATORVASTATIN CALCIUM 80 MG: 40 TABLET, FILM COATED ORAL at 20:37

## 2025-08-24 RX ADMIN — SODIUM CHLORIDE, PRESERVATIVE FREE 10 ML: 5 INJECTION INTRAVENOUS at 20:39

## 2025-08-24 RX ADMIN — Medication 2000 UNITS: at 20:38

## 2025-08-24 RX ADMIN — ENOXAPARIN SODIUM 40 MG: 100 INJECTION SUBCUTANEOUS at 20:39

## 2025-08-24 RX ADMIN — SODIUM CHLORIDE 1000 ML: 0.9 INJECTION, SOLUTION INTRAVENOUS at 21:43

## 2025-08-24 RX ADMIN — ASPIRIN 81 MG: 81 TABLET, CHEWABLE ORAL at 20:37

## 2025-08-24 RX ADMIN — IOPAMIDOL 100 ML: 755 INJECTION, SOLUTION INTRAVENOUS at 15:42

## 2025-08-24 ASSESSMENT — PAIN - FUNCTIONAL ASSESSMENT: PAIN_FUNCTIONAL_ASSESSMENT: 0-10

## 2025-08-24 ASSESSMENT — PAIN SCALES - GENERAL
PAINLEVEL_OUTOF10: 0

## 2025-08-24 ASSESSMENT — LIFESTYLE VARIABLES
HOW OFTEN DO YOU HAVE A DRINK CONTAINING ALCOHOL: 2-3 TIMES A WEEK
HOW MANY STANDARD DRINKS CONTAINING ALCOHOL DO YOU HAVE ON A TYPICAL DAY: 1 OR 2

## 2025-08-25 ENCOUNTER — APPOINTMENT (OUTPATIENT)
Facility: HOSPITAL | Age: 50
DRG: 072 | End: 2025-08-25
Attending: STUDENT IN AN ORGANIZED HEALTH CARE EDUCATION/TRAINING PROGRAM
Payer: COMMERCIAL

## 2025-08-25 VITALS
HEIGHT: 69 IN | WEIGHT: 179.01 LBS | BODY MASS INDEX: 26.51 KG/M2 | RESPIRATION RATE: 16 BRPM | OXYGEN SATURATION: 96 % | DIASTOLIC BLOOD PRESSURE: 99 MMHG | HEART RATE: 78 BPM | TEMPERATURE: 97.9 F | SYSTOLIC BLOOD PRESSURE: 129 MMHG

## 2025-08-25 PROBLEM — G45.4 TRANSIENT GLOBAL AMNESIA: Status: ACTIVE | Noted: 2025-08-25

## 2025-08-25 PROBLEM — R41.0 EPISODE OF CONFUSION: Status: ACTIVE | Noted: 2025-08-25

## 2025-08-25 LAB
ANION GAP SERPL CALC-SCNC: 9 MMOL/L (ref 2–14)
BUN SERPL-MCNC: 21 MG/DL (ref 6–20)
BUN/CREAT SERPL: 17 (ref 12–20)
CALCIUM SERPL-MCNC: 9.4 MG/DL (ref 8.6–10)
CHLORIDE SERPL-SCNC: 105 MMOL/L (ref 98–107)
CO2 SERPL-SCNC: 24 MMOL/L (ref 20–29)
CREAT SERPL-MCNC: 1.19 MG/DL (ref 0.7–1.2)
ECHO AV AREA PEAK VELOCITY: 2.3 CM2
ECHO AV AREA PEAK VELOCITY: 2.4 CM2
ECHO AV AREA VTI: 2.5 CM2
ECHO AV AREA/BSA VTI: 1.3 CM2/M2
ECHO AV MEAN GRADIENT: 3 MMHG
ECHO AV MEAN VELOCITY: 0.8 M/S
ECHO AV PEAK GRADIENT: 4 MMHG
ECHO AV PEAK GRADIENT: 5 MMHG
ECHO AV PEAK VELOCITY: 1 M/S
ECHO AV PEAK VELOCITY: 1.1 M/S
ECHO AV VTI: 22.9 CM
ECHO BSA: 1.99 M2
ECHO LV EF PHYSICIAN: 55 %
ECHO LV FRACTIONAL SHORTENING: 60 % (ref 28–44)
ECHO LV INTERNAL DIMENSION DIASTOLE INDEX: 2.18 CM/M2
ECHO LV INTERNAL DIMENSION DIASTOLIC: 4.3 CM (ref 4.2–5.9)
ECHO LV INTERNAL DIMENSION SYSTOLIC INDEX: 0.86 CM/M2
ECHO LV INTERNAL DIMENSION SYSTOLIC: 1.7 CM
ECHO LV IVSD: 1 CM (ref 0.6–1)
ECHO LV MASS 2D: 142.5 G (ref 88–224)
ECHO LV MASS INDEX 2D: 72.3 G/M2 (ref 49–115)
ECHO LV POSTERIOR WALL DIASTOLIC: 1 CM (ref 0.6–1)
ECHO LV RELATIVE WALL THICKNESS RATIO: 0.47
ECHO LVOT AREA: 3.8 CM2
ECHO LVOT AV VTI INDEX: 0.65
ECHO LVOT DIAM: 2.2 CM
ECHO LVOT MEAN GRADIENT: 1 MMHG
ECHO LVOT PEAK GRADIENT: 2 MMHG
ECHO LVOT PEAK VELOCITY: 0.7 M/S
ECHO LVOT STROKE VOLUME INDEX: 28.7 ML/M2
ECHO LVOT SV: 56.6 ML
ECHO LVOT VTI: 14.9 CM
ECHO MV A VELOCITY: 0.48 M/S
ECHO MV AREA VTI: 2.8 CM2
ECHO MV E DECELERATION TIME (DT): 220.5 MS
ECHO MV E VELOCITY: 0.63 M/S
ECHO MV E/A RATIO: 1.31
ECHO MV LVOT VTI INDEX: 1.34
ECHO MV MAX VELOCITY: 0.9 M/S
ECHO MV MEAN GRADIENT: 1 MMHG
ECHO MV MEAN VELOCITY: 0.6 M/S
ECHO MV PEAK GRADIENT: 3 MMHG
ECHO MV REGURGITANT PEAK GRADIENT: 0 MMHG
ECHO MV REGURGITANT PEAK VELOCITY: 0 M/S
ECHO MV VTI: 20 CM
ECHO TV REGURGITANT MAX VELOCITY: 1.65 M/S
ECHO TV REGURGITANT PEAK GRADIENT: 11 MMHG
ERYTHROCYTE [DISTWIDTH] IN BLOOD BY AUTOMATED COUNT: 12.2 % (ref 11.5–14.5)
GLUCOSE SERPL-MCNC: 87 MG/DL (ref 65–100)
HCT VFR BLD AUTO: 45.8 % (ref 36.6–50.3)
HGB BLD-MCNC: 15.2 G/DL (ref 12.1–17)
MCH RBC QN AUTO: 29.7 PG (ref 26–34)
MCHC RBC AUTO-ENTMCNC: 33.2 G/DL (ref 30–36.5)
MCV RBC AUTO: 89.5 FL (ref 80–99)
NRBC # BLD: 0 K/UL (ref 0–0.01)
NRBC BLD-RTO: 0 PER 100 WBC
PLATELET # BLD AUTO: 185 K/UL (ref 150–400)
PMV BLD AUTO: 10.7 FL (ref 8.9–12.9)
POTASSIUM SERPL-SCNC: 4.3 MMOL/L (ref 3.5–5.1)
RBC # BLD AUTO: 5.12 M/UL (ref 4.1–5.7)
SODIUM SERPL-SCNC: 139 MMOL/L (ref 136–145)
WBC # BLD AUTO: 3.2 K/UL (ref 4.1–11.1)

## 2025-08-25 PROCEDURE — 99223 1ST HOSP IP/OBS HIGH 75: CPT | Performed by: PSYCHIATRY & NEUROLOGY

## 2025-08-25 PROCEDURE — 2500000003 HC RX 250 WO HCPCS: Performed by: STUDENT IN AN ORGANIZED HEALTH CARE EDUCATION/TRAINING PROGRAM

## 2025-08-25 PROCEDURE — 95816 EEG AWAKE AND DROWSY: CPT

## 2025-08-25 PROCEDURE — 2580000003 HC RX 258: Performed by: HOSPITALIST

## 2025-08-25 PROCEDURE — 97535 SELF CARE MNGMENT TRAINING: CPT

## 2025-08-25 PROCEDURE — 80048 BASIC METABOLIC PNL TOTAL CA: CPT

## 2025-08-25 PROCEDURE — 95957 EEG DIGITAL ANALYSIS: CPT

## 2025-08-25 PROCEDURE — 36415 COLL VENOUS BLD VENIPUNCTURE: CPT

## 2025-08-25 PROCEDURE — 93325 DOPPLER ECHO COLOR FLOW MAPG: CPT

## 2025-08-25 PROCEDURE — 6370000000 HC RX 637 (ALT 250 FOR IP): Performed by: STUDENT IN AN ORGANIZED HEALTH CARE EDUCATION/TRAINING PROGRAM

## 2025-08-25 PROCEDURE — 97165 OT EVAL LOW COMPLEX 30 MIN: CPT

## 2025-08-25 PROCEDURE — 95816 EEG AWAKE AND DROWSY: CPT | Performed by: PSYCHIATRY & NEUROLOGY

## 2025-08-25 PROCEDURE — 85027 COMPLETE CBC AUTOMATED: CPT

## 2025-08-25 RX ORDER — ASPIRIN 81 MG/1
81 TABLET, CHEWABLE ORAL DAILY
Qty: 30 TABLET | Refills: 3 | Status: SHIPPED | OUTPATIENT
Start: 2025-08-26

## 2025-08-25 RX ORDER — SODIUM CHLORIDE 9 MG/ML
INJECTION, SOLUTION INTRAVENOUS CONTINUOUS
Status: DISCONTINUED | OUTPATIENT
Start: 2025-08-25 | End: 2025-08-25 | Stop reason: HOSPADM

## 2025-08-25 RX ORDER — ATORVASTATIN CALCIUM 80 MG/1
80 TABLET, FILM COATED ORAL NIGHTLY
Qty: 30 TABLET | Refills: 3 | Status: SHIPPED | OUTPATIENT
Start: 2025-08-25

## 2025-08-25 RX ADMIN — Medication 2000 UNITS: at 10:08

## 2025-08-25 RX ADMIN — SODIUM CHLORIDE: 0.9 INJECTION, SOLUTION INTRAVENOUS at 10:43

## 2025-08-25 RX ADMIN — SODIUM CHLORIDE, PRESERVATIVE FREE 10 ML: 5 INJECTION INTRAVENOUS at 10:08

## 2025-08-25 RX ADMIN — CETIRIZINE HYDROCHLORIDE 5 MG: 10 TABLET ORAL at 10:10

## 2025-08-25 RX ADMIN — ASPIRIN 81 MG: 81 TABLET, CHEWABLE ORAL at 10:08

## 2025-08-25 ASSESSMENT — VISUAL ACUITY: OU: 1

## 2025-08-25 ASSESSMENT — PAIN SCALES - GENERAL
PAINLEVEL_OUTOF10: 0

## 2025-09-03 ENCOUNTER — TRANSCRIBE ORDERS (OUTPATIENT)
Facility: HOSPITAL | Age: 50
End: 2025-09-03

## 2025-09-03 DIAGNOSIS — E04.1 THYROID NODULE: Primary | ICD-10-CM
